# Patient Record
Sex: FEMALE | Race: WHITE | Employment: FULL TIME | URBAN - METROPOLITAN AREA
[De-identification: names, ages, dates, MRNs, and addresses within clinical notes are randomized per-mention and may not be internally consistent; named-entity substitution may affect disease eponyms.]

---

## 2018-11-26 ENCOUNTER — HOSPITAL ENCOUNTER (OUTPATIENT)
Dept: MAMMOGRAPHY | Age: 38
Discharge: HOME | End: 2018-11-26
Payer: COMMERCIAL

## 2018-11-26 DIAGNOSIS — Z12.31 SCREENING MAMMOGRAM, ENCOUNTER FOR: ICD-10-CM

## 2018-11-26 PROCEDURE — 77063 BREAST TOMOSYNTHESIS BI: CPT

## 2019-02-07 ENCOUNTER — HOSPITAL ENCOUNTER (OUTPATIENT)
Age: 39
Discharge: HOME | End: 2019-02-07
Payer: COMMERCIAL

## 2019-02-07 ENCOUNTER — HOSPITAL ENCOUNTER (OUTPATIENT)
Dept: GENERAL RADIOLOGY | Age: 39
Discharge: HOME | End: 2019-02-07
Payer: COMMERCIAL

## 2019-02-07 DIAGNOSIS — R05.9 COUGH: ICD-10-CM

## 2019-02-07 PROCEDURE — 71046 X-RAY EXAM CHEST 2 VIEWS: CPT

## 2019-02-13 ENCOUNTER — HOSPITAL ENCOUNTER (OUTPATIENT)
Age: 39
Discharge: HOME | End: 2019-02-13
Payer: COMMERCIAL

## 2019-02-13 ENCOUNTER — HOSPITAL ENCOUNTER (OUTPATIENT)
Dept: GENERAL RADIOLOGY | Age: 39
Discharge: HOME | End: 2019-02-13
Payer: COMMERCIAL

## 2019-02-13 DIAGNOSIS — J18.9 PNEUMONIA OF LEFT UPPER LOBE DUE TO INFECTIOUS ORGANISM: ICD-10-CM

## 2019-02-13 PROCEDURE — 71046 X-RAY EXAM CHEST 2 VIEWS: CPT

## 2019-02-15 ENCOUNTER — HOSPITAL ENCOUNTER (OUTPATIENT)
Dept: CT IMAGING | Age: 39
Discharge: HOME | End: 2019-02-15
Payer: COMMERCIAL

## 2019-02-15 DIAGNOSIS — R91.1 LUNG NODULE: ICD-10-CM

## 2019-02-15 PROCEDURE — 6360000004 HC RX CONTRAST MEDICATION: Performed by: FAMILY MEDICINE

## 2019-02-15 PROCEDURE — 71260 CT THORAX DX C+: CPT

## 2019-02-15 RX ADMIN — IOPAMIDOL 80 ML: 755 INJECTION, SOLUTION INTRAVENOUS at 16:04

## 2020-01-02 ENCOUNTER — HOSPITAL ENCOUNTER (OUTPATIENT)
Dept: MAMMOGRAPHY | Age: 40
Discharge: HOME | End: 2020-01-02
Payer: COMMERCIAL

## 2020-01-02 PROCEDURE — 77063 BREAST TOMOSYNTHESIS BI: CPT

## 2021-05-17 ENCOUNTER — HOSPITAL ENCOUNTER (OUTPATIENT)
Dept: WOMENS IMAGING | Age: 41
Discharge: HOME | End: 2021-05-17
Payer: COMMERCIAL

## 2021-05-17 DIAGNOSIS — Z12.31 SCREENING MAMMOGRAM, ENCOUNTER FOR: ICD-10-CM

## 2021-05-17 PROCEDURE — 77063 BREAST TOMOSYNTHESIS BI: CPT

## 2021-05-18 ENCOUNTER — TELEPHONE (OUTPATIENT)
Dept: WOMENS IMAGING | Age: 41
End: 2021-05-18

## 2022-05-12 ENCOUNTER — HOSPITAL ENCOUNTER (OUTPATIENT)
Age: 42
Discharge: HOME | End: 2022-05-12
Payer: COMMERCIAL

## 2022-05-12 LAB
FOLATE: 6.37 NG/ML (ref 4.78–24.2)
T4 FREE: 1.3 NG/DL (ref 0.9–1.8)
TSH SERPL DL<=0.05 MIU/L-ACNC: 12.74 UIU/ML (ref 0.27–4.2)
VITAMIN B-12: 449 PG/ML (ref 211–911)
VITAMIN D 25-HYDROXY: 55 NG/ML

## 2022-05-12 PROCEDURE — 84443 ASSAY THYROID STIM HORMONE: CPT

## 2022-05-12 PROCEDURE — 82746 ASSAY OF FOLIC ACID SERUM: CPT

## 2022-05-12 PROCEDURE — 82306 VITAMIN D 25 HYDROXY: CPT

## 2022-05-12 PROCEDURE — 82607 VITAMIN B-12: CPT

## 2022-05-12 PROCEDURE — 84439 ASSAY OF FREE THYROXINE: CPT

## 2022-06-15 ENCOUNTER — HOSPITAL ENCOUNTER (OUTPATIENT)
Age: 42
Discharge: HOME | End: 2022-06-15
Payer: COMMERCIAL

## 2022-06-15 LAB
T4 FREE: 1.8 NG/DL (ref 0.9–1.8)
TSH SERPL DL<=0.05 MIU/L-ACNC: 3.2 UIU/ML (ref 0.27–4.2)

## 2022-06-15 PROCEDURE — 84439 ASSAY OF FREE THYROXINE: CPT

## 2022-06-15 PROCEDURE — 84443 ASSAY THYROID STIM HORMONE: CPT

## 2022-09-12 ENCOUNTER — HOSPITAL ENCOUNTER (EMERGENCY)
Age: 42
Discharge: HOME | End: 2022-09-12
Attending: STUDENT IN AN ORGANIZED HEALTH CARE EDUCATION/TRAINING PROGRAM
Payer: COMMERCIAL

## 2022-09-12 VITALS
OXYGEN SATURATION: 99 % | BODY MASS INDEX: 44.64 KG/M2 | TEMPERATURE: 98 F | RESPIRATION RATE: 18 BRPM | HEART RATE: 65 BPM | WEIGHT: 285 LBS | DIASTOLIC BLOOD PRESSURE: 91 MMHG | SYSTOLIC BLOOD PRESSURE: 180 MMHG

## 2022-09-12 DIAGNOSIS — M71.21 SYNOVIAL CYST OF RIGHT POPLITEAL SPACE: ICD-10-CM

## 2022-09-12 DIAGNOSIS — M79.604 RIGHT LEG PAIN: Primary | ICD-10-CM

## 2022-09-12 PROCEDURE — 93971 EXTREMITY STUDY: CPT

## 2022-09-12 PROCEDURE — 99284 EMERGENCY DEPT VISIT MOD MDM: CPT

## 2022-09-12 ASSESSMENT — PAIN - FUNCTIONAL ASSESSMENT: PAIN_FUNCTIONAL_ASSESSMENT: 0-10

## 2022-09-12 ASSESSMENT — PAIN SCALES - GENERAL: PAINLEVEL_OUTOF10: 10

## 2022-09-12 ASSESSMENT — LIFESTYLE VARIABLES: HOW OFTEN DO YOU HAVE A DRINK CONTAINING ALCOHOL: NEVER

## 2022-09-12 NOTE — Clinical Note
Sarika Gonzalez was seen and treated in our emergency department on 9/12/2022. She may return to work on 09/13/2022. If you have any questions or concerns, please don't hesitate to call.       Katya Us MD

## 2022-09-12 NOTE — DISCHARGE INSTRUCTIONS
You were seen in the emergency department for right leg pain. Fortunately your ultrasound did not show any blood clot. Your pain is likely due to a Baker's cyst.  Please follow-up with your primary care doctor in the next 2 days regarding her symptoms and recovery. He can return to the emergency department at anytime with any new or ongoing health concerns. We hope you feel better soon.

## 2022-09-12 NOTE — ED PROVIDER NOTES
ATTENDING PHYSICIAN NOTE       Date of evaluation: 9/12/2022    Chief Complaint     Leg Pain (Right leg pain for a few weeks, behind right knee and calf, denies recent travel, no history of blood clots, not on blood thinners)      History of Present Illness     Cass Lopez is a 38 y.o. female who presents right leg pain for the past few weeks. States that pain is behind right knee and calf. States that pain comes and goes, when present 10/10, worsens with ambulation. Reports some swelling. She denies any hormone use, recent surgery, recent travel. She denies any history of DVT or PE in the past.  Denies any family history. Denies any injury or trauma to her leg. Denies any fever, vomiting, chest pain, shortness of breath or abdominal pain. Denies any change in bowel or bladder. Denies any history of heart failure or significant cardiac disease. Review of Systems     Review of Systems   All other systems reviewed and are negative. Past Medical, Surgical, Family, and Social History     She has a past medical history of Bell's palsy, Goiter, simple, and Morbid obesity ([address] She has a past surgical history that includes knee surgery and Tonsillectomy. Her family history includes Breast Cancer in her maternal aunt; Cancer in her mother; Heart Disease in her maternal aunt, maternal grandfather, maternal grandmother, and mother; Stroke in her mother. She reports that she is a non-smoker but has been exposed to tobacco smoke. She has never used smokeless tobacco. She reports that she does not drink alcohol and does not use drugs. Medications     Previous Medications    DEXAMETHASONE (DECADRON) 2 MG TABLET    Take all five tablets at one time on 7/24/2016       Allergies     She is allergic to codeine. Physical Exam     INITIAL VITALS: BP: (!) 180/91, Temp: 98 °F (36.7 °C), Heart Rate: 65, Resp: 18, SpO2: 99 %   Physical Exam  Vitals and nursing note reviewed.    Constitutional:       General: She is not in acute distress. Appearance: She is normal weight. She is not toxic-appearing. HENT:      Head: Normocephalic and atraumatic. Right Ear: External ear normal.      Left Ear: External ear normal.      Nose: Nose normal.      Mouth/Throat:      Pharynx: Oropharynx is clear. No oropharyngeal exudate or posterior oropharyngeal erythema. Eyes:      Extraocular Movements: Extraocular movements intact. Conjunctiva/sclera: Conjunctivae normal.   Cardiovascular:      Rate and Rhythm: Normal rate and regular rhythm. Pulses: Normal pulses. Heart sounds: Normal heart sounds. Pulmonary:      Effort: Pulmonary effort is normal. No respiratory distress. Breath sounds: Normal breath sounds. Abdominal:      General: There is no distension. Palpations: Abdomen is soft. Tenderness: There is no abdominal tenderness. There is no right CVA tenderness, left CVA tenderness, guarding or rebound. Musculoskeletal:      Cervical back: Normal range of motion and neck supple. Comments: Mild asymmetric swelling with right leg greater than left. She has tenderness posteriorly in the long calf, no overlying erythema or warmth, neurovascularly intact, range of motion limited by pain. No focal bony tenderness   Skin:     General: Skin is warm. Capillary Refill: Capillary refill takes less than 2 seconds. Findings: No bruising or erythema. Neurological:      General: No focal deficit present. Cranial Nerves: No cranial nerve deficit. Sensory: No sensory deficit. Motor: No weakness. Coordination: Coordination normal.   Psychiatric:         Mood and Affect: Mood normal.         Behavior: Behavior normal.       Diagnostic Results       RADIOLOGY:  VL Extremity Venous Right             LABS:   No results found for this visit on 09/12/22. ED BEDSIDE ULTRASOUND:  No results found.     RECENT VITALS:  BP: (!) 180/91,Temp: 98 °F (36.7 °C), Heart Rate: 65, Resp: 18, SpO2: 99 %     Procedures         ED Course     Nursing Notes, Past Medical Hx, Past Surgical Hx, Social Hx,Allergies, and Family Hx were reviewed. patient was given the following medications:  No orders of the defined types were placed in this encounter. CONSULTS:  None    MEDICAL DECISIONMAKING / ASSESSMENT / PLAN     Yancy Cummings is a 38 y.o. female who presents with right leg pain. She was hypertensive, afebrile, heart rate of 65, normal respiratory rate of 18 and satting at 99% on room air. On exam she had right calf tenderness and swelling. Given history and exam I am concerned for underlying DVT. No signs or symptoms concerning for cellulitis. She has no traumatic injury to suggest focal bony abnormality. She is neurovascularly intact with normal pulses therefore doubt underlying emboli. I obtained vascular Doppler ultrasound as noted below    I interpreted the imaging and note  Venous Doppler right lower extremity negative for acute DVT. Notable for Baker's cyst    Given work-up above presentation likely secondary to Espino's cyst.  Again no evidence of DVT. She has no hemodynamic instability to suggest PE. Advised patient to follow-up with her primary care doctor. She can take ibuprofen and Tylenol as needed for pain. All questions and concerns were addressed. Strict return precautions reviewed. Clinical Impression     1. Right leg pain    2.  Synovial cyst of right popliteal space        Disposition     PATIENT REFERRED TO:  SANDIE Hernandez - CNP  [facility] [address]  [address] [facility]  [phone]    In 2 days      DISCHARGE MEDICATIONS:  New Prescriptions    No medications on file       DISPOSITION Decision To Discharge 09/12/2022 12:35:49 PM          Peter De León MD  09/12/22 3133

## 2022-09-13 ENCOUNTER — TELEPHONE (OUTPATIENT)
Dept: ORTHOPEDIC SURGERY | Age: 42
End: 2022-09-13

## 2022-09-13 ENCOUNTER — OFFICE VISIT (OUTPATIENT)
Dept: ORTHOPEDIC SURGERY | Age: 42
End: 2022-09-13
Payer: COMMERCIAL

## 2022-09-13 VITALS — BODY MASS INDEX: 45.99 KG/M2 | WEIGHT: 293 LBS | HEIGHT: 67 IN

## 2022-09-13 DIAGNOSIS — S83.231A COMPLEX TEAR OF MEDIAL MENISCUS OF RIGHT KNEE AS CURRENT INJURY, INITIAL ENCOUNTER: Primary | ICD-10-CM

## 2022-09-13 DIAGNOSIS — M71.21 SYNOVIAL CYST OF RIGHT KNEE: ICD-10-CM

## 2022-09-13 DIAGNOSIS — M94.261 CHONDROMALACIA OF KNEE, RIGHT: ICD-10-CM

## 2022-09-13 DIAGNOSIS — M25.561 ACUTE PAIN OF RIGHT KNEE: ICD-10-CM

## 2022-09-13 PROCEDURE — 99204 OFFICE O/P NEW MOD 45 MIN: CPT | Performed by: ORTHOPAEDIC SURGERY

## 2022-09-13 RX ORDER — LEVOTHYROXINE SODIUM 175 UG/1
175 TABLET ORAL DAILY
COMMUNITY

## 2022-09-13 RX ORDER — LISINOPRIL 10 MG/1
10 TABLET ORAL DAILY
COMMUNITY

## 2022-09-13 RX ORDER — MELOXICAM 15 MG/1
15 TABLET ORAL DAILY PRN
Qty: 30 TABLET | Refills: 0 | Status: SHIPPED | OUTPATIENT
Start: 2022-09-13

## 2022-09-13 NOTE — PROGRESS NOTES
ORTHOPAEDIC SURGERY H&P / CONSULTATION NOTE    Chief complaint:   Chief Complaint   Patient presents with    Knee Pain     Espino cyst      History of present illness: The patient is a 38 y.o. female with subjective symptoms of right knee pain. The chief complaint is located at posterior aspect right knee, occasional medial/lateral. Duration of symptoms has been for 2 weeks. The severity of symptoms is rated at 0/10 pain while sitting however 7-8/10 pain by walking on intake form. Patient denies trauma but states that sharp pains with walking and specifically with mechanical twisting knee pain. She had so much pain that she went to the emergency room on 9/12/2022. Ultrasound confirmed no DVT however she did have a Baker's cyst per his report. She states dull throbbing aching pain. Occasional swelling. She states sharp pain with twisting and knee motion. Denies gross instability. The patient has tried the below listed items prior to today's consultation for above listed chief complaint.     -   Over-the-counter anti-inflammatories/prescription medication anti-inflammatory. -   Physical therapy / guided home exercise program -     -   Previous corticosteroid injections    Past medical history:    Past Medical History:   Diagnosis Date    Bell's palsy     Goiter, simple     Morbid obesity (HCC)         Past surgical history:    Past Surgical History:   Procedure Laterality Date    KNEE SURGERY      in 6th grade left knee    TONSILLECTOMY      when 6 yrs old        Allergies:     Allergies   Allergen Reactions    Codeine Nausea And Vomiting         Medications:   Current Outpatient Medications:     levothyroxine (SYNTHROID) 175 MCG tablet, Take 175 mcg by mouth Daily, Disp: , Rfl:     lisinopril (PRINIVIL;ZESTRIL) 10 MG tablet, Take 10 mg by mouth daily, Disp: , Rfl:     meloxicam (MOBIC) 15 MG tablet, Take 1 tablet by mouth daily as needed for Pain, Disp: 30 tablet, Rfl: 0    dexamethasone (DECADRON) 2 MG tablet, Take all five tablets at one time on 7/24/2016 (Patient not taking: Reported on 9/13/2022), Disp: 5 tablet, Rfl: 0     Social history: Denies IV drug use. Social History     Socioeconomic History    Marital status:      Spouse name: Not on file    Number of children: Not on file    Years of education: Not on file    Highest education level: Not on file   Occupational History    Not on file   Tobacco Use    Smoking status: Never     Passive exposure: Yes    Smokeless tobacco: Never   Substance and Sexual Activity    Alcohol use: No    Drug use: No    Sexual activity: Yes     Partners: Male   Other Topics Concern    Not on file   Social History Narrative    Not on file     Social Determinants of Health     Financial Resource Strain: Not on file   Food Insecurity: Not on file   Transportation Needs: Not on file   Physical Activity: Not on file   Stress: Not on file   Social Connections: Not on file   Intimate Partner Violence: Not on file   Housing Stability: Not on file     Tobacco use. Social History     Tobacco Use   Smoking Status Never    Passive exposure: Yes   Smokeless Tobacco Never     Employment: Noncontributory    Workers compensation claim: Noncontributory    Review of systems: Patient denies any fevers chills chest pain shortness of breath nausea vomiting significant weight loss any change in voiding or bowel movements. Patient denies any significant numbness or tingling at baseline as it relates to this presenting symptom/chief complaint. The patient denies any significant problems with skin or any significant allergies. Physical examination:  Body mass index is 46.36 kg/m².           Positive antalgic gait to the right  AAOx3, NCAT  EOMI  MMM  RR  Unlabored breathing, no wheezing  Skin intact BUE and BLE, warm and moist  Bilateral lower extremity examination specific to subjective symptoms  Exam Right Lower Extremity  Negative effusion, 0/110/0 limited secondary to pain active ROM (E/F/Lag), same P assive ROM (E/F/Lag), negative anterior Drawer, 1A Lachman,   negative posterior Drawer,   Stable varus/valgus at 0 and 30?,    positive medial/lateral TTP Joint Line, positive medial/lateral Edilberto,   trace Abhijit's  Skin intact throughout  5/5 IP Q H TA G EHL  SILT DP SP LP MP S S  +2 DP pulse      Diagnostic imaging:  MY READ:  4 view right knee 9/13/2022: Negative fracture. Positive medial joint space narrowing and patellofemoral joint space narrowing mild. Ultrasound right lower extremity venous duplex: 9/12/2022:    Impressions   Right Impression   Technically difficult and limited study due to body habitus and depth of   vessels. Within the limits of this exam, there is no evidence of deep or superficial   venous thrombosis involving the right lower extremity. Incidental finding of a cystic structure at the lateral joint space measuring   5.30 cm consistent with a Baker's cyst.   Left Impression   There is no evidence of deep venous thrombosis involving the left common   femoral vein. There is no previous exam for comparison. Conclusions        Summary        No evidence of deep vein or superficial thrombosis involving the right lower    extremity and the contralateral proximal common femoral vein. Pertinent lab work: None     Diagnosis Orders   1. Complex tear of medial meniscus of right knee as current injury, initial encounter  MRI KNEE RIGHT WO CONTRAST    meloxicam (MOBIC) 15 MG tablet      2. Chondromalacia of knee, right  MRI KNEE RIGHT WO CONTRAST      3. Synovial cyst of right knee        4.  Acute pain of right knee  XR KNEE RIGHT (MIN 4 VIEWS)          Assessment and plan: 38 y.o. female with current subjective symptoms and physical exam findings with diagnostic imaging correlating to right knee medial meniscus tear in the setting of chondromalacia and Baker's cyst.  -Time of 23 minutes was spent coordinating and discussing the clinical findings, reviewing diagnostic imaging as indicated, coordinating care with prior notes review and current clinical encounter documentation as it pertains to the patient's presenting subjective symptoms and diagnoses. -I reviewed with the patient the imaging findings as well as clinical exam and  how it correlates to subjective symptoms.  -Additional time was taken today to review previous notes in the ER as well as review with the patient findings of ultrasound from yesterday. I also took time to review with her her radiographs today.  -She has pretty significant pain with ambulation. I did offer crutches however she does not wish for any. Her knee is stable ligamentously. I would like to obtain a MRI to look at the medial meniscus as well as lateral meniscus given clinical examination findings and also ultrasound showing a Baker's cyst.  I did review with her the natural history evolution with regard to Baker's cyst symptoms waxing and waning and did not necessarily advocate for aspiration in the office.  -Continue activity modification to include low impact elliptical stationary bike swimming and walking  -Physician directed physical therapy program was printed out and given to the patient today  -Mobic 15 mg p.o. daily as needed pain and OTC Tylenol per bottle as needed discomfort  -All questions answered to the patient's satisfaction and the patient expressed understanding and agreement with the above listed treatment plan  -Follow up in after MRI completed to review additional treatment options to include nonoperative versus surgical treatment  -Thank you for the clinical consultation and allowing me to participate in the patient's care. Electronically signed by Silvino Brownlee MD on 9/13/22 at 2:03 PM XIMENA Brownlee MD       Orthopaedic Surgery-Sports Medicine        Disclaimer: This note was dictated with voice recognition software.   Though review and correction are routinely performed, please contact the office/medical records for any errors requiring correction.

## 2022-09-13 NOTE — TELEPHONE ENCOUNTER
If cyst is drained today, yes patient would be able to drive afterwards. Dr. Apurva Avila will go over treatment plan during office visit today.  KB

## 2022-09-13 NOTE — TELEPHONE ENCOUNTER
General Question     Subject: RT KNEE/NP APPT TODAY  Patient Request: Gabriellewally Arielle  Contact Number: [phone]    PATIENT HAS A QUESTION REGARDING APPT FOR TODAY. WAS TOLD SHE DOES HAVE A BAKERS CYST, AND IS ASKING IF THIS IS DRAINED TODAY, WILL SHE BE ABLE TO DRIVE AFTERWARD.     PLEASE ADVISE

## 2022-09-14 ENCOUNTER — TELEPHONE (OUTPATIENT)
Dept: ORTHOPEDIC SURGERY | Age: 42
End: 2022-09-14

## 2022-09-14 NOTE — TELEPHONE ENCOUNTER
MRI RT KNEE APPROVED. Celso Sheldon # [id number]  DATE RANGE 09/13/22-10/22/22. Called patient to let them know that their MRI has been authorized and that they can call and schedule scan at their convenience. Also told them that they can call and schedule a f/u with Dr. Sirena Mann once they have MRI scheduled, leaving at least 2-3 days for our office to receive their results.   Mitzy Payne

## 2024-08-15 ENCOUNTER — HOSPITAL ENCOUNTER (EMERGENCY)
Age: 44
Discharge: HOME | End: 2024-08-15
Attending: EMERGENCY MEDICINE
Payer: COMMERCIAL

## 2024-08-15 ENCOUNTER — APPOINTMENT (OUTPATIENT)
Dept: GENERAL RADIOLOGY | Age: 44
End: 2024-08-15
Payer: COMMERCIAL

## 2024-08-15 VITALS
HEART RATE: 84 BPM | BODY MASS INDEX: 45.99 KG/M2 | RESPIRATION RATE: 16 BRPM | TEMPERATURE: 98.2 F | WEIGHT: 293 LBS | DIASTOLIC BLOOD PRESSURE: 75 MMHG | OXYGEN SATURATION: 100 % | HEIGHT: 67 IN | SYSTOLIC BLOOD PRESSURE: 166 MMHG

## 2024-08-15 DIAGNOSIS — R31.9 HEMATURIA, UNSPECIFIED TYPE: ICD-10-CM

## 2024-08-15 DIAGNOSIS — R60.0 LOWER EXTREMITY EDEMA: Primary | ICD-10-CM

## 2024-08-15 DIAGNOSIS — D64.9 ANEMIA, UNSPECIFIED TYPE: ICD-10-CM

## 2024-08-15 DIAGNOSIS — R05.1 ACUTE COUGH: ICD-10-CM

## 2024-08-15 DIAGNOSIS — I51.7 CARDIOMEGALY: ICD-10-CM

## 2024-08-15 LAB
ALBUMIN SERPL-MCNC: 3.4 G/DL (ref 3.4–5)
ALBUMIN/GLOB SERPL: 1.2 {RATIO} (ref 1.1–2.2)
ALP SERPL-CCNC: 104 U/L (ref 40–129)
ALT SERPL-CCNC: 25 U/L (ref 10–40)
ANION GAP SERPL CALCULATED.3IONS-SCNC: 9 MMOL/L (ref 3–16)
AST SERPL-CCNC: 28 U/L (ref 15–37)
BACTERIA URNS QL MICRO: ABNORMAL /HPF
BASOPHILS # BLD: 0 K/UL (ref 0–0.2)
BASOPHILS NFR BLD: 0.2 %
BILIRUB SERPL-MCNC: 0.6 MG/DL (ref 0–1)
BILIRUB UR QL STRIP.AUTO: NEGATIVE
BUN SERPL-MCNC: 33 MG/DL (ref 7–20)
CALCIUM SERPL-MCNC: 8.2 MG/DL (ref 8.3–10.6)
CHLORIDE SERPL-SCNC: 105 MMOL/L (ref 99–110)
CLARITY UR: CLEAR
CO2 SERPL-SCNC: 22 MMOL/L (ref 21–32)
COLOR UR: YELLOW
CREAT SERPL-MCNC: 1 MG/DL (ref 0.6–1.1)
DEPRECATED RDW RBC AUTO: 15.5 % (ref 12.4–15.4)
EKG ATRIAL RATE: 62 BPM
EKG DIAGNOSIS: NORMAL
EKG P AXIS: 33 DEGREES
EKG P-R INTERVAL: 158 MS
EKG Q-T INTERVAL: 410 MS
EKG QRS DURATION: 92 MS
EKG QTC CALCULATION (BAZETT): 416 MS
EKG R AXIS: 40 DEGREES
EKG T AXIS: 26 DEGREES
EKG VENTRICULAR RATE: 62 BPM
EOSINOPHIL # BLD: 0.1 K/UL (ref 0–0.6)
EOSINOPHIL NFR BLD: 1.8 %
EPI CELLS #/AREA URNS HPF: ABNORMAL /HPF (ref 0–5)
FLUAV RNA RESP QL NAA+PROBE: NOT DETECTED
FLUBV RNA RESP QL NAA+PROBE: NOT DETECTED
GFR SERPLBLD CREATININE-BSD FMLA CKD-EPI: 70 ML/MIN/{1.73_M2}
GLUCOSE SERPL-MCNC: 111 MG/DL (ref 70–99)
GLUCOSE UR STRIP.AUTO-MCNC: NEGATIVE MG/DL
HCG SERPL QL: NEGATIVE
HCT VFR BLD AUTO: 30.7 % (ref 36–48)
HGB BLD-MCNC: 9.5 G/DL (ref 12–16)
HGB UR QL STRIP.AUTO: ABNORMAL
KETONES UR STRIP.AUTO-MCNC: NEGATIVE MG/DL
LEUKOCYTE ESTERASE UR QL STRIP.AUTO: NEGATIVE
LYMPHOCYTES # BLD: 0.9 K/UL (ref 1–5.1)
LYMPHOCYTES NFR BLD: 12.5 %
MCH RBC QN AUTO: 22.2 PG (ref 26–34)
MCHC RBC AUTO-ENTMCNC: 31 G/DL (ref 31–36)
MCV RBC AUTO: 71.8 FL (ref 80–100)
MONOCYTES # BLD: 0.8 K/UL (ref 0–1.3)
MONOCYTES NFR BLD: 10.2 %
NEUTROPHILS # BLD: 5.7 K/UL (ref 1.7–7.7)
NEUTROPHILS NFR BLD: 75.3 %
NITRITE UR QL STRIP.AUTO: NEGATIVE
NT-PROBNP SERPL-MCNC: 731 PG/ML (ref 0–124)
PH UR STRIP.AUTO: 6 [PH] (ref 5–8)
PLATELET # BLD AUTO: 231 K/UL (ref 135–450)
PMV BLD AUTO: 8 FL (ref 5–10.5)
POTASSIUM SERPL-SCNC: 4.7 MMOL/L (ref 3.5–5.1)
PROT SERPL-MCNC: 6.3 G/DL (ref 6.4–8.2)
PROT UR STRIP.AUTO-MCNC: 100 MG/DL
RBC # BLD AUTO: 4.28 M/UL (ref 4–5.2)
RBC #/AREA URNS HPF: ABNORMAL /HPF (ref 0–4)
S PYO AG THROAT QL: NEGATIVE
SARS-COV-2 RNA RESP QL NAA+PROBE: NOT DETECTED
SODIUM SERPL-SCNC: 136 MMOL/L (ref 136–145)
SP GR UR STRIP.AUTO: 1.01 (ref 1–1.03)
UA COMPLETE W REFLEX CULTURE PNL UR: ABNORMAL
UA DIPSTICK W REFLEX MICRO PNL UR: YES
URN SPEC COLLECT METH UR: ABNORMAL
UROBILINOGEN UR STRIP-ACNC: 0.2 E.U./DL
WBC # BLD AUTO: 7.6 K/UL (ref 4–11)
WBC #/AREA URNS HPF: ABNORMAL /HPF (ref 0–5)

## 2024-08-15 PROCEDURE — 84703 CHORIONIC GONADOTROPIN ASSAY: CPT

## 2024-08-15 PROCEDURE — 85025 COMPLETE CBC W/AUTO DIFF WBC: CPT

## 2024-08-15 PROCEDURE — 81001 URINALYSIS AUTO W/SCOPE: CPT

## 2024-08-15 PROCEDURE — 80053 COMPREHEN METABOLIC PANEL: CPT

## 2024-08-15 PROCEDURE — 87081 CULTURE SCREEN ONLY: CPT

## 2024-08-15 PROCEDURE — 99285 EMERGENCY DEPT VISIT HI MDM: CPT

## 2024-08-15 PROCEDURE — 93005 ELECTROCARDIOGRAM TRACING: CPT | Performed by: EMERGENCY MEDICINE

## 2024-08-15 PROCEDURE — 2580000003 HC RX 258

## 2024-08-15 PROCEDURE — 93010 ELECTROCARDIOGRAM REPORT: CPT | Performed by: INTERNAL MEDICINE

## 2024-08-15 PROCEDURE — 6360000002 HC RX W HCPCS: Performed by: EMERGENCY MEDICINE

## 2024-08-15 PROCEDURE — 83880 ASSAY OF NATRIURETIC PEPTIDE: CPT

## 2024-08-15 PROCEDURE — 71045 X-RAY EXAM CHEST 1 VIEW: CPT

## 2024-08-15 PROCEDURE — 6360000002 HC RX W HCPCS

## 2024-08-15 PROCEDURE — 96374 THER/PROPH/DIAG INJ IV PUSH: CPT

## 2024-08-15 PROCEDURE — 87636 SARSCOV2 & INF A&B AMP PRB: CPT

## 2024-08-15 PROCEDURE — 96375 TX/PRO/DX INJ NEW DRUG ADDON: CPT

## 2024-08-15 PROCEDURE — 87880 STREP A ASSAY W/OPTIC: CPT

## 2024-08-15 RX ORDER — FUROSEMIDE 10 MG/ML
20 INJECTION INTRAMUSCULAR; INTRAVENOUS ONCE
Status: COMPLETED | OUTPATIENT
Start: 2024-08-15 | End: 2024-08-15

## 2024-08-15 RX ORDER — TOPIRAMATE 25 MG/1
50 TABLET ORAL 2 TIMES DAILY
COMMUNITY

## 2024-08-15 RX ORDER — FUROSEMIDE 20 MG/1
20 TABLET ORAL DAILY
Qty: 30 TABLET | Refills: 3 | Status: SHIPPED | OUTPATIENT
Start: 2024-08-15

## 2024-08-15 RX ORDER — MULTIVIT-MIN/IRON/FOLIC ACID/K 18-600-40
CAPSULE ORAL
COMMUNITY

## 2024-08-15 RX ADMIN — FUROSEMIDE 20 MG: 10 INJECTION, SOLUTION INTRAMUSCULAR; INTRAVENOUS at 11:15

## 2024-08-15 RX ADMIN — METHYLPREDNISOLONE SODIUM SUCCINATE 125 MG: 125 INJECTION INTRAMUSCULAR; INTRAVENOUS at 08:55

## 2024-08-15 ASSESSMENT — PAIN - FUNCTIONAL ASSESSMENT
PAIN_FUNCTIONAL_ASSESSMENT: NONE - DENIES PAIN

## 2024-08-15 ASSESSMENT — LIFESTYLE VARIABLES
HOW MANY STANDARD DRINKS CONTAINING ALCOHOL DO YOU HAVE ON A TYPICAL DAY: 1 OR 2
HOW OFTEN DO YOU HAVE A DRINK CONTAINING ALCOHOL: MONTHLY OR LESS

## 2024-08-15 NOTE — ED PROVIDER NOTES
Emergency Department Attending Provider Note  Location: [facility]  ED  8/15/2024     Patient Identification  Court Rascon is a 44 y.o. female      Court Rascon was evaluated in the Emergency Department for bilateral pedal edema, ear pain, possible tongue swelling.     HPI: as noted above    Physical Exam: Mildly elevated blood pressure, old left Bell's palsy, clear TMs, normal tongue, clear breath sounds, trace pedal edema bilaterally      EKG Interpretation  Rhythm is sinus  Rate is 62  Axis is NL  No STEMI criteria  Qtc is 416      Patient seen and evaluated.  Relevant records reviewed.  Patient presents with symptoms noted above.   Patient was found to have an elevated BNP and signs of mild pulmonary vascular congestion on chest x-ray.  Most consistent with new heart failure.  Given Lasix in the ER.  She continues to breathe comfortably-no dyspnea, no hypoxia.  She is familiar with heart failure as her  has this problem.  We spoke about inpatient observation/management.  Prefers to go home.  Will discharge with Lasix, encouraged to follow-up with cardiology as soon as possible, will discharge with return precautions    Although initial history and physical exam information was obtained by HECTOR/NPP/MD/DO (who also dictated a record of this visit), I personally saw the patient and made/approved the management plan and take responsibility for patient management. I, Dr. Quijano, am the primary clinician of record.   This chart was generated in part by using Dragon Dictation system and may contain errors related to that system including errors in grammar, punctuation, and spelling, as well as words and phrases that may be inappropriate. If there are any questions or concerns please feel free to contact the dictating provider for clarification.     Kenney Quijano MD   [facility] [facility] [employer]       Kenney Quijano MD  08/15/24 5233

## 2024-08-15 NOTE — ED PROVIDER NOTES
facility-administered medications for this encounter.     Current Outpatient Medications   Medication Sig Dispense Refill    topiramate (TOPAMAX) 25 MG tablet Take 2 tablets by mouth 2 times daily      Cholecalciferol (VITAMIN D) 50 MCG (2000 UT) CAPS capsule Take by mouth      furosemide (LASIX) 20 MG tablet Take 1 tablet by mouth daily 30 tablet 3    levothyroxine (SYNTHROID) 175 MCG tablet Take 1 tablet by mouth Daily      lisinopril (PRINIVIL;ZESTRIL) 10 MG tablet Take 1 tablet by mouth daily      meloxicam (MOBIC) 15 MG tablet Take 1 tablet by mouth daily as needed for Pain 30 tablet 0    dexamethasone (DECADRON) 2 MG tablet Take all five tablets at one time on 7/24/2016 5 tablet 0     Allergies:  Allergies   Allergen Reactions    Codeine Nausea And Vomiting     Screenings:     Rodman Coma Scale  Eye Opening: Spontaneous  Best Verbal Response: Oriented  Best Motor Response: Obeys commands  Rodman Coma Scale Score: 15           CIWA Assessment  BP: (!) 166/75  Pulse: 84          REVIEW OF SYSTEMS  Positives and Pertinent Negatives as per HPI.    PHYSICAL EXAM  ED Triage Vitals [08/15/24 0821]   BP Systolic BP Percentile Diastolic BP Percentile Temp Temp Source Pulse Respirations SpO2   (!) 189/104 -- -- 98.2 °F (36.8 °C) Oral 83 18 100 %      Height Weight - Scale         1.702 m (5' 7\") (!) 139.7 kg (308 lb)             Physical Exam  Vitals and nursing note reviewed.   Constitutional:       General: She is not in acute distress.     Appearance: Normal appearance. She is not ill-appearing, toxic-appearing or diaphoretic.   HENT:      Head: Normocephalic and atraumatic.      Nose: Nose normal.      Mouth/Throat:      Mouth: Mucous membranes are moist.   Eyes:      Extraocular Movements: Extraocular movements intact.      Pupils: Pupils are equal, round, and reactive to light.   Cardiovascular:      Rate and Rhythm: Normal rate and regular rhythm.   Pulmonary:      Effort: Pulmonary effort is normal. No  [facility] [place], [facility]  [address]  [facility] [address]  [phone]          Discharge Medications:   Discharge Medication List as of 8/15/2024 11:45 AM        START taking these medications    Details   furosemide (LASIX) 20 MG tablet Take 1 tablet by mouth daily, Disp-30 tablet, R-3Normal           Discontinued Medications:  Discharge Medication List as of 8/15/2024 11:45 AM        Risk management discussed and shared decision making had with patient and/or surrogate. All questions were answered. Patient will follow up with PCP and cardiology for further evaluation/treatment.  All questions answered.  Patient will return to ED for new/worsening symptoms.    DISPOSITION:  Patient was discharged.    (Please note that portions of this note were completed with a voice recognition program.  Efforts were made to edit the dictations but occasionally words are mis-transcribed).         Gonzalez Clark, MIRYAM  08/15/24 9600

## 2024-08-17 LAB — S PYO THROAT QL CULT: NORMAL

## 2024-09-03 ENCOUNTER — HOSPITAL ENCOUNTER (INPATIENT)
Age: 44
LOS: 3 days | Discharge: HOME | DRG: 812 | End: 2024-09-06
Attending: STUDENT IN AN ORGANIZED HEALTH CARE EDUCATION/TRAINING PROGRAM | Admitting: INTERNAL MEDICINE
Payer: COMMERCIAL

## 2024-09-03 ENCOUNTER — APPOINTMENT (OUTPATIENT)
Dept: GENERAL RADIOLOGY | Age: 44
DRG: 812 | End: 2024-09-03
Payer: COMMERCIAL

## 2024-09-03 DIAGNOSIS — R60.0 EDEMA, LOWER EXTREMITY: ICD-10-CM

## 2024-09-03 DIAGNOSIS — N17.9 AKI (ACUTE KIDNEY INJURY) (HCC): Primary | ICD-10-CM

## 2024-09-03 DIAGNOSIS — I50.9 CONGESTIVE HEART FAILURE, UNSPECIFIED HF CHRONICITY, UNSPECIFIED HEART FAILURE TYPE (HCC): ICD-10-CM

## 2024-09-03 LAB
ALBUMIN SERPL-MCNC: 3.1 G/DL (ref 3.4–5)
ALBUMIN/GLOB SERPL: 0.8 {RATIO} (ref 1.1–2.2)
ALP SERPL-CCNC: 100 U/L (ref 40–129)
ALT SERPL-CCNC: 17 U/L (ref 10–40)
ANION GAP SERPL CALCULATED.3IONS-SCNC: 9 MMOL/L (ref 3–16)
AST SERPL-CCNC: 17 U/L (ref 15–37)
BASOPHILS # BLD: 0 K/UL (ref 0–0.2)
BASOPHILS NFR BLD: 0.3 %
BILIRUB SERPL-MCNC: 0.3 MG/DL (ref 0–1)
BUN SERPL-MCNC: 56 MG/DL (ref 7–20)
CALCIUM SERPL-MCNC: 7.6 MG/DL (ref 8.3–10.6)
CHLORIDE SERPL-SCNC: 99 MMOL/L (ref 99–110)
CO2 SERPL-SCNC: 23 MMOL/L (ref 21–32)
CREAT SERPL-MCNC: 1.3 MG/DL (ref 0.6–1.1)
DEPRECATED RDW RBC AUTO: 16.9 % (ref 12.4–15.4)
EOSINOPHIL # BLD: 0.3 K/UL (ref 0–0.6)
EOSINOPHIL NFR BLD: 3.4 %
GFR SERPLBLD CREATININE-BSD FMLA CKD-EPI: 51 ML/MIN/{1.73_M2}
GLUCOSE SERPL-MCNC: 131 MG/DL (ref 70–99)
HCT VFR BLD AUTO: 29 % (ref 36–48)
HGB BLD-MCNC: 9.3 G/DL (ref 12–16)
LYMPHOCYTES # BLD: 0.9 K/UL (ref 1–5.1)
LYMPHOCYTES NFR BLD: 8.7 %
MCH RBC QN AUTO: 22.7 PG (ref 26–34)
MCHC RBC AUTO-ENTMCNC: 32 G/DL (ref 31–36)
MCV RBC AUTO: 70.7 FL (ref 80–100)
MONOCYTES # BLD: 1 K/UL (ref 0–1.3)
MONOCYTES NFR BLD: 9.6 %
NEUTROPHILS # BLD: 7.7 K/UL (ref 1.7–7.7)
NEUTROPHILS NFR BLD: 78 %
NT-PROBNP SERPL-MCNC: 742 PG/ML (ref 0–124)
PLATELET # BLD AUTO: 390 K/UL (ref 135–450)
PMV BLD AUTO: 7.3 FL (ref 5–10.5)
POTASSIUM SERPL-SCNC: 4.2 MMOL/L (ref 3.5–5.1)
PROT SERPL-MCNC: 6.8 G/DL (ref 6.4–8.2)
RBC # BLD AUTO: 4.1 M/UL (ref 4–5.2)
SODIUM SERPL-SCNC: 131 MMOL/L (ref 136–145)
TROPONIN, HIGH SENSITIVITY: 7 NG/L (ref 0–14)
WBC # BLD AUTO: 9.9 K/UL (ref 4–11)

## 2024-09-03 PROCEDURE — 71046 X-RAY EXAM CHEST 2 VIEWS: CPT

## 2024-09-03 PROCEDURE — 80053 COMPREHEN METABOLIC PANEL: CPT

## 2024-09-03 PROCEDURE — 2580000003 HC RX 258: Performed by: PHYSICIAN ASSISTANT

## 2024-09-03 PROCEDURE — 93005 ELECTROCARDIOGRAM TRACING: CPT | Performed by: STUDENT IN AN ORGANIZED HEALTH CARE EDUCATION/TRAINING PROGRAM

## 2024-09-03 PROCEDURE — 96374 THER/PROPH/DIAG INJ IV PUSH: CPT

## 2024-09-03 PROCEDURE — 96375 TX/PRO/DX INJ NEW DRUG ADDON: CPT

## 2024-09-03 PROCEDURE — 84484 ASSAY OF TROPONIN QUANT: CPT

## 2024-09-03 PROCEDURE — 6360000002 HC RX W HCPCS: Performed by: PHYSICIAN ASSISTANT

## 2024-09-03 PROCEDURE — 85025 COMPLETE CBC W/AUTO DIFF WBC: CPT

## 2024-09-03 PROCEDURE — 1200000000 HC SEMI PRIVATE

## 2024-09-03 PROCEDURE — 83880 ASSAY OF NATRIURETIC PEPTIDE: CPT

## 2024-09-03 PROCEDURE — 99285 EMERGENCY DEPT VISIT HI MDM: CPT

## 2024-09-03 PROCEDURE — 96361 HYDRATE IV INFUSION ADD-ON: CPT

## 2024-09-03 RX ORDER — ONDANSETRON 2 MG/ML
4 INJECTION INTRAMUSCULAR; INTRAVENOUS EVERY 6 HOURS PRN
Status: DISCONTINUED | OUTPATIENT
Start: 2024-09-03 | End: 2024-09-06 | Stop reason: HOSPADM

## 2024-09-03 RX ORDER — MAGNESIUM SULFATE IN WATER 40 MG/ML
2000 INJECTION, SOLUTION INTRAVENOUS PRN
Status: DISCONTINUED | OUTPATIENT
Start: 2024-09-03 | End: 2024-09-06 | Stop reason: HOSPADM

## 2024-09-03 RX ORDER — SODIUM CHLORIDE 9 MG/ML
INJECTION, SOLUTION INTRAVENOUS PRN
Status: DISCONTINUED | OUTPATIENT
Start: 2024-09-03 | End: 2024-09-06 | Stop reason: HOSPADM

## 2024-09-03 RX ORDER — ACETAMINOPHEN 650 MG/1
650 SUPPOSITORY RECTAL EVERY 6 HOURS PRN
Status: DISCONTINUED | OUTPATIENT
Start: 2024-09-03 | End: 2024-09-06 | Stop reason: HOSPADM

## 2024-09-03 RX ORDER — FUROSEMIDE 10 MG/ML
40 INJECTION INTRAMUSCULAR; INTRAVENOUS ONCE
Status: COMPLETED | OUTPATIENT
Start: 2024-09-03 | End: 2024-09-03

## 2024-09-03 RX ORDER — SODIUM CHLORIDE 0.9 % (FLUSH) 0.9 %
5-40 SYRINGE (ML) INJECTION EVERY 12 HOURS SCHEDULED
Status: DISCONTINUED | OUTPATIENT
Start: 2024-09-03 | End: 2024-09-06 | Stop reason: HOSPADM

## 2024-09-03 RX ORDER — POTASSIUM CHLORIDE 7.45 MG/ML
10 INJECTION INTRAVENOUS PRN
Status: DISCONTINUED | OUTPATIENT
Start: 2024-09-03 | End: 2024-09-06 | Stop reason: HOSPADM

## 2024-09-03 RX ORDER — ENOXAPARIN SODIUM 100 MG/ML
30 INJECTION SUBCUTANEOUS 2 TIMES DAILY
Status: DISCONTINUED | OUTPATIENT
Start: 2024-09-04 | End: 2024-09-06 | Stop reason: HOSPADM

## 2024-09-03 RX ORDER — 0.9 % SODIUM CHLORIDE 0.9 %
1000 INTRAVENOUS SOLUTION INTRAVENOUS ONCE
Status: COMPLETED | OUTPATIENT
Start: 2024-09-03 | End: 2024-09-03

## 2024-09-03 RX ORDER — POTASSIUM CHLORIDE 1500 MG/1
40 TABLET, EXTENDED RELEASE ORAL PRN
Status: DISCONTINUED | OUTPATIENT
Start: 2024-09-03 | End: 2024-09-06 | Stop reason: HOSPADM

## 2024-09-03 RX ORDER — FUROSEMIDE 10 MG/ML
40 INJECTION INTRAMUSCULAR; INTRAVENOUS 2 TIMES DAILY
Status: DISCONTINUED | OUTPATIENT
Start: 2024-09-04 | End: 2024-09-04

## 2024-09-03 RX ORDER — NITROGLYCERIN 0.4 MG/1
0.4 TABLET SUBLINGUAL EVERY 5 MIN PRN
Status: DISCONTINUED | OUTPATIENT
Start: 2024-09-03 | End: 2024-09-06 | Stop reason: HOSPADM

## 2024-09-03 RX ORDER — SODIUM CHLORIDE 0.9 % (FLUSH) 0.9 %
5-40 SYRINGE (ML) INJECTION PRN
Status: DISCONTINUED | OUTPATIENT
Start: 2024-09-03 | End: 2024-09-06 | Stop reason: HOSPADM

## 2024-09-03 RX ORDER — LEVOTHYROXINE SODIUM 50 UG/1
175 TABLET ORAL DAILY
Status: DISCONTINUED | OUTPATIENT
Start: 2024-09-04 | End: 2024-09-06 | Stop reason: HOSPADM

## 2024-09-03 RX ORDER — ACETAMINOPHEN 325 MG/1
650 TABLET ORAL EVERY 6 HOURS PRN
Status: DISCONTINUED | OUTPATIENT
Start: 2024-09-03 | End: 2024-09-06 | Stop reason: HOSPADM

## 2024-09-03 RX ORDER — POLYETHYLENE GLYCOL 3350 17 G/17G
17 POWDER, FOR SOLUTION ORAL DAILY PRN
Status: DISCONTINUED | OUTPATIENT
Start: 2024-09-03 | End: 2024-09-06 | Stop reason: HOSPADM

## 2024-09-03 RX ORDER — ONDANSETRON 4 MG/1
4 TABLET, ORALLY DISINTEGRATING ORAL EVERY 8 HOURS PRN
Status: DISCONTINUED | OUTPATIENT
Start: 2024-09-03 | End: 2024-09-06 | Stop reason: HOSPADM

## 2024-09-03 RX ADMIN — FUROSEMIDE 40 MG: 10 INJECTION, SOLUTION INTRAMUSCULAR; INTRAVENOUS at 22:22

## 2024-09-03 RX ADMIN — SODIUM CHLORIDE 1000 ML: 9 INJECTION, SOLUTION INTRAVENOUS at 21:31

## 2024-09-03 ASSESSMENT — LIFESTYLE VARIABLES
HOW OFTEN DO YOU HAVE A DRINK CONTAINING ALCOHOL: NEVER
HOW MANY STANDARD DRINKS CONTAINING ALCOHOL DO YOU HAVE ON A TYPICAL DAY: PATIENT DOES NOT DRINK

## 2024-09-04 LAB
ANION GAP SERPL CALCULATED.3IONS-SCNC: 12 MMOL/L (ref 3–16)
BASOPHILS # BLD: 0 K/UL (ref 0–0.2)
BASOPHILS NFR BLD: 0.4 %
BUN SERPL-MCNC: 50 MG/DL (ref 7–20)
CALCIUM SERPL-MCNC: 7.3 MG/DL (ref 8.3–10.6)
CHLORIDE SERPL-SCNC: 102 MMOL/L (ref 99–110)
CHOLEST SERPL-MCNC: 160 MG/DL (ref 0–199)
CO2 SERPL-SCNC: 22 MMOL/L (ref 21–32)
CREAT SERPL-MCNC: 1.2 MG/DL (ref 0.6–1.1)
DEPRECATED RDW RBC AUTO: 16.5 % (ref 12.4–15.4)
EKG ATRIAL RATE: 100 BPM
EKG DIAGNOSIS: NORMAL
EKG P AXIS: 43 DEGREES
EKG P-R INTERVAL: 156 MS
EKG Q-T INTERVAL: 360 MS
EKG QRS DURATION: 88 MS
EKG QTC CALCULATION (BAZETT): 464 MS
EKG R AXIS: 31 DEGREES
EKG T AXIS: 35 DEGREES
EKG VENTRICULAR RATE: 100 BPM
EOSINOPHIL # BLD: 0.3 K/UL (ref 0–0.6)
EOSINOPHIL NFR BLD: 3.8 %
GFR SERPLBLD CREATININE-BSD FMLA CKD-EPI: 56 ML/MIN/{1.73_M2}
GLUCOSE SERPL-MCNC: 117 MG/DL (ref 70–99)
HCT VFR BLD AUTO: 27.7 % (ref 36–48)
HDLC SERPL-MCNC: 25 MG/DL (ref 40–60)
HGB BLD-MCNC: 8.9 G/DL (ref 12–16)
INR PPP: 1.16 (ref 0.85–1.15)
IRON SATN MFR SERPL: 5 % (ref 15–50)
IRON SERPL-MCNC: 11 UG/DL (ref 37–145)
LDLC SERPL CALC-MCNC: 109 MG/DL
LYMPHOCYTES # BLD: 1 K/UL (ref 1–5.1)
LYMPHOCYTES NFR BLD: 11.1 %
MAGNESIUM SERPL-MCNC: 2.5 MG/DL (ref 1.8–2.4)
MCH RBC QN AUTO: 22.4 PG (ref 26–34)
MCHC RBC AUTO-ENTMCNC: 31.9 G/DL (ref 31–36)
MCV RBC AUTO: 70.2 FL (ref 80–100)
MONOCYTES # BLD: 1.1 K/UL (ref 0–1.3)
MONOCYTES NFR BLD: 12.3 %
NEUTROPHILS # BLD: 6.4 K/UL (ref 1.7–7.7)
NEUTROPHILS NFR BLD: 72.4 %
PLATELET # BLD AUTO: 373 K/UL (ref 135–450)
PMV BLD AUTO: 7.7 FL (ref 5–10.5)
POTASSIUM SERPL-SCNC: 3.9 MMOL/L (ref 3.5–5.1)
PROTHROMBIN TIME: 15 SEC (ref 11.9–14.9)
RBC # BLD AUTO: 3.95 M/UL (ref 4–5.2)
SODIUM SERPL-SCNC: 136 MMOL/L (ref 136–145)
TIBC SERPL-MCNC: 219 UG/DL (ref 260–445)
TRIGL SERPL-MCNC: 132 MG/DL (ref 0–150)
TROPONIN, HIGH SENSITIVITY: 7 NG/L (ref 0–14)
TROPONIN, HIGH SENSITIVITY: 8 NG/L (ref 0–14)
TSH SERPL DL<=0.005 MIU/L-ACNC: 3.72 UIU/ML (ref 0.27–4.2)
VLDLC SERPL CALC-MCNC: 26 MG/DL
WBC # BLD AUTO: 8.8 K/UL (ref 4–11)

## 2024-09-04 PROCEDURE — 96372 THER/PROPH/DIAG INJ SC/IM: CPT

## 2024-09-04 PROCEDURE — 6370000000 HC RX 637 (ALT 250 FOR IP): Performed by: STUDENT IN AN ORGANIZED HEALTH CARE EDUCATION/TRAINING PROGRAM

## 2024-09-04 PROCEDURE — 6370000000 HC RX 637 (ALT 250 FOR IP): Performed by: INTERNAL MEDICINE

## 2024-09-04 PROCEDURE — 80061 LIPID PANEL: CPT

## 2024-09-04 PROCEDURE — G0378 HOSPITAL OBSERVATION PER HR: HCPCS

## 2024-09-04 PROCEDURE — 83735 ASSAY OF MAGNESIUM: CPT

## 2024-09-04 PROCEDURE — 84443 ASSAY THYROID STIM HORMONE: CPT

## 2024-09-04 PROCEDURE — 84484 ASSAY OF TROPONIN QUANT: CPT

## 2024-09-04 PROCEDURE — 1200000000 HC SEMI PRIVATE

## 2024-09-04 PROCEDURE — 6360000002 HC RX W HCPCS: Performed by: INTERNAL MEDICINE

## 2024-09-04 PROCEDURE — 97116 GAIT TRAINING THERAPY: CPT

## 2024-09-04 PROCEDURE — 85025 COMPLETE CBC W/AUTO DIFF WBC: CPT

## 2024-09-04 PROCEDURE — 85610 PROTHROMBIN TIME: CPT

## 2024-09-04 PROCEDURE — 97530 THERAPEUTIC ACTIVITIES: CPT

## 2024-09-04 PROCEDURE — 99222 1ST HOSP IP/OBS MODERATE 55: CPT | Performed by: STUDENT IN AN ORGANIZED HEALTH CARE EDUCATION/TRAINING PROGRAM

## 2024-09-04 PROCEDURE — 83550 IRON BINDING TEST: CPT

## 2024-09-04 PROCEDURE — 36415 COLL VENOUS BLD VENIPUNCTURE: CPT

## 2024-09-04 PROCEDURE — 93010 ELECTROCARDIOGRAM REPORT: CPT | Performed by: INTERNAL MEDICINE

## 2024-09-04 PROCEDURE — 2580000003 HC RX 258: Performed by: INTERNAL MEDICINE

## 2024-09-04 PROCEDURE — 97161 PT EVAL LOW COMPLEX 20 MIN: CPT

## 2024-09-04 PROCEDURE — 83540 ASSAY OF IRON: CPT

## 2024-09-04 PROCEDURE — 80048 BASIC METABOLIC PNL TOTAL CA: CPT

## 2024-09-04 RX ORDER — LOSARTAN POTASSIUM AND HYDROCHLOROTHIAZIDE 25; 100 MG/1; MG/1
1 TABLET ORAL DAILY
Status: ON HOLD | COMMUNITY
End: 2024-09-06 | Stop reason: HOSPADM

## 2024-09-04 RX ORDER — CARVEDILOL 3.12 MG/1
3.12 TABLET ORAL 2 TIMES DAILY WITH MEALS
Status: DISCONTINUED | OUTPATIENT
Start: 2024-09-04 | End: 2024-09-06 | Stop reason: HOSPADM

## 2024-09-04 RX ORDER — CLONIDINE HYDROCHLORIDE 0.1 MG/1
0.1 TABLET ORAL 2 TIMES DAILY
Status: DISCONTINUED | OUTPATIENT
Start: 2024-09-04 | End: 2024-09-06 | Stop reason: HOSPADM

## 2024-09-04 RX ORDER — FERROUS SULFATE 325(65) MG
325 TABLET ORAL 2 TIMES DAILY WITH MEALS
Status: DISCONTINUED | OUTPATIENT
Start: 2024-09-04 | End: 2024-09-06 | Stop reason: HOSPADM

## 2024-09-04 RX ORDER — AMLODIPINE BESYLATE 5 MG/1
5 TABLET ORAL DAILY
Status: ON HOLD | COMMUNITY
End: 2024-09-06 | Stop reason: HOSPADM

## 2024-09-04 RX ADMIN — ACETAMINOPHEN 650 MG: 325 TABLET ORAL at 22:38

## 2024-09-04 RX ADMIN — FERROUS SULFATE TAB 325 MG (65 MG ELEMENTAL FE) 325 MG: 325 (65 FE) TAB at 12:40

## 2024-09-04 RX ADMIN — FERROUS SULFATE TAB 325 MG (65 MG ELEMENTAL FE) 325 MG: 325 (65 FE) TAB at 19:08

## 2024-09-04 RX ADMIN — CLONIDINE HYDROCHLORIDE 0.1 MG: 0.1 TABLET ORAL at 12:40

## 2024-09-04 RX ADMIN — LEVOTHYROXINE SODIUM 175 MCG: 0.05 TABLET ORAL at 06:54

## 2024-09-04 RX ADMIN — SODIUM CHLORIDE, PRESERVATIVE FREE 10 ML: 5 INJECTION INTRAVENOUS at 21:47

## 2024-09-04 RX ADMIN — CLONIDINE HYDROCHLORIDE 0.1 MG: 0.1 TABLET ORAL at 21:14

## 2024-09-04 RX ADMIN — ENOXAPARIN SODIUM 30 MG: 100 INJECTION SUBCUTANEOUS at 09:33

## 2024-09-04 RX ADMIN — ENOXAPARIN SODIUM 30 MG: 100 INJECTION SUBCUTANEOUS at 21:14

## 2024-09-04 RX ADMIN — CARVEDILOL 3.12 MG: 3.12 TABLET, FILM COATED ORAL at 19:08

## 2024-09-04 ASSESSMENT — PAIN SCALES - GENERAL
PAINLEVEL_OUTOF10: 0
PAINLEVEL_OUTOF10: 3
PAINLEVEL_OUTOF10: 0

## 2024-09-04 ASSESSMENT — PAIN - FUNCTIONAL ASSESSMENT: PAIN_FUNCTIONAL_ASSESSMENT: PREVENTS OR INTERFERES SOME ACTIVE ACTIVITIES AND ADLS

## 2024-09-04 ASSESSMENT — PAIN DESCRIPTION - LOCATION: LOCATION: LEG

## 2024-09-04 ASSESSMENT — PAIN DESCRIPTION - DESCRIPTORS: DESCRIPTORS: ACHING

## 2024-09-04 ASSESSMENT — PAIN DESCRIPTION - ORIENTATION: ORIENTATION: MID

## 2024-09-04 NOTE — PROGRESS NOTES
Occupational Therapy  Per patient and PT, pt is at baseline fxn. Pt reports no concerns with ADLs or fxl mobility. Please re order if new needs arise. No charge    Ml Johns, OTR/L

## 2024-09-04 NOTE — DISCHARGE INSTRUCTIONS
Heart Failure Resources:  Heart Failure Interactive Workbook:  Go to [email or web address]://[email or web address].Snaptee/publication/?o=867880 for a Free Heart Failure Interactive Workbook provided by The American Heart Association. This interactive workbook will provide information on Healthier Living with Heart Failure. Please copy and paste link into search bar. Use your mouse to scroll through the pages.    HF [place] callie:   Heart Failure Free smart phone callie available for iPhone and Android download. Use your phone to track sodium intake, fluid intake, symptoms, and weight.     Low Sodium Diet / Recipes:  Go to www.[employer] website for “renal” diet which is Low Sodium! [employer] is a dialysis company, but this website offers free seasonal cookbooks. Each quarter, they will release 25-30 new recipes with a breakdown of calories, sodium, and glucose. You can also go to www.[employer]/recipes website for free recipes.       Home Exercise Program:   Identification of Green/Yellow/Red zones:  You should be able to identify when you feel good (green zone), if you have 1-2 symptoms of HF (yellow zone), or if you are in need of medical attention (red zone).  In your CHF education folder you were provided a “stop light tool” to outline this information.     We want to you to rate your exertion levels:    Our therapy team has discussed means of identification with you such as the \"Mani scale.\"  The Mani rating scale ranges from 6 to 20, where 6 means \"no exertion at all\" and 20 means \"maximal exertion.\" The goal is to use this to gauge how much effort it is taking for you to do your normal daily tasks.   You should be able to recognize when too much exertion is being expended.    Elements of Energy Conservation:   Prioritize/Plan: Decide what needs to be done today, and what can wait for a later date, write to do lists, plan ahead to avoid extra trips, and gather supplies and equipment needed before starting an activity.   Position:

## 2024-09-04 NOTE — CONSULTS
CARDIOLOGY CONSULTATION        Patient Name: Court Rascon  Date of admission: 9/3/2024  8:32 PM  Admission Dx: RUI (acute kidney injury) (HCC) [N17.9]  Edema, lower extremity [R60.0]  Congestive heart failure, unspecified HF chronicity, unspecified heart failure type (HCC) [I50.9]  Requesting Physician: Alba Silver MD  Primary Care physician: Christine Wesley APRN - CNP    Reason for Consultation/Chief Complaint: LE edema/HF    History of Present Illness:     Court Rascon is a 44 y.o. patient with past medical history of iron deficiency anemia, lower extremity edema, hypertension, history of Bell's palsy with residual left-sided facial deficits, history of thyroid disease status post thyroidectomy, morbid obesity who presented to the hospital with complaints of fatigue and lower extremity edema.  Patient recently seen cardiology at [facility] who recommended patient be evaluated in the hospital.  Patient was admitted on 8/20/2024 for concerns of acute decompensated heart failure.  Echo showed preserved EF, diastolic function and no significant valvular disease.  RV was normal size and with normal function.  She was started on losartan HCTZ as well as daily furosemide.  Patient states she was doing okay but then she had worsening fatigue and still had lower extremity edema.  Patient states prior history of angioedema to ACE inhibitor.  Patient endorses she is on her period and has heavy menstrual cycles with clotting, chronic.  Has not seen gynecology.  Endorses some dyspnea on exertion.  Patient endorses snoring as well as his spouse.  Daytime somnolence.    The patient denies chest pain, shortness of breath at rest, Denies palpitations, dizziness, near-syncope or hi syncope. Denies paroxysmal nocturnal dyspnea, orthopnea, bendopnea, increasing lower extremity edema.    Remote smoker, 2 cigarettes/day for 10 years.  Quit 10 years ago.  Does not drink or use recreational drugs.  History of    Diagnosis    Status post induction of labor    Edema, lower extremity         I will address the patient's cardiac risk factors and adjusted pharmacologic treatment as needed. In addition, I have reinforced the need for patient directed risk factor modification.  All questions and concerns were addressed to the patient/family. Alternatives to my treatment were discussed.     Thank you for allowing us to participate in the care of Court Rascon. Please call me with any questions [phone].    Dwayne Osorio, DO   Cardiovascular Disease  [facility]  [phone] [place] Office  [phone] [place] Office  9/4/2024 4:24 PM

## 2024-09-04 NOTE — CONSULTS
Nutrition Note    CHF Nutrition Education    Consult received for heart failure diet education.  Education deferred or not appropriate at this time related to  pt has been meeting with RD in [facility] and talks about healthy choices and does 1:1 counseling .      Electronically signed by Preeti Gutierrez RD, LD on 9/4/2024 at 2:28 PM      Electronically signed by Preeti Gutierrez RD, LD on 9/4/24 at 2:28 PM EDT    Contact: [phone]

## 2024-09-04 NOTE — ED NOTES
Court Rascon is a 44 y.o. female admitted for  Principal Problem:    Edema, lower extremity  Resolved Problems:    * No resolved hospital problems. *  .   Patient Home via self with   Chief Complaint   Patient presents with    Abnormal Lab     Pt reports she got labs drawn on Thursday (Hgb 9.8, Hct 32.3, Iron 7). Pt reports she feels very dehydrated, and has been sleeping a lot. Pt states she recently was on her period and was bleeding a lot.    .  Patient is alert and Person, Place, Time, and Situation  Patient's baseline mobility: Baseline Mobility: Independent   Code Status: Full Code   Cardiac Rhythm:       Is patient on baseline Oxygen: no how many Liters:   Abnormal Assessment Findings:     Isolation: None      NIH Score:    C-SSRS: Risk of Suicide: No Risk  Bedside swallow:        Active LDA's:   Peripheral IV 09/03/24 Left;Proximal Forearm (Active)     Patient admitted with a mckeon: no If the mckeon is chronic was it exchanged:  Reason for mckeon:   Patient admitted with Central Line:  . PICC line placement confirmed: YES OR NO:[id number]}   Reason for Central line:   Was central line Inserted from an outside facility:        Family/Caregiver Present no Any Concerns: no   Restraints no  Sitter no         Vitals:      Vitals:    09/03/24 2042   BP: (!) 177/87   Pulse: 92   Resp: 18   Temp: 99.1 °F (37.3 °C)   TempSrc: Oral   SpO2: 99%       Last documented pain score (0-10 scale)    Pain medication administered .    Pertinent or High Risk Medications/Drips: No.    Pending Blood Product Administration: no    Abnormal labs:   Abnormal Labs Reviewed   CBC WITH AUTO DIFFERENTIAL - Abnormal; Notable for the following components:       Result Value    Hemoglobin 9.3 (*)     Hematocrit 29.0 (*)     MCV 70.7 (*)     MCH 22.7 (*)     RDW 16.9 (*)     Lymphocytes Absolute 0.9 (*)     All other components within normal limits   COMPREHENSIVE METABOLIC PANEL W/ REFLEX TO MG FOR LOW K - Abnormal; Notable for the following

## 2024-09-04 NOTE — ED PROVIDER NOTES
I did not personally evaluate this patient but I was asked to review the EKG.     EKG  The Ekg interpreted by myself in the emergency department in the absence of a cardiologist.  normal sinus rhythm with a rate of 100  Axis is   Normal  QTc is  within an acceptable range  Intervals and Durations are unremarkable.      No specific ST-T wave changes appreciated.  No evidence of acute ischemia.   No significant change from prior EKG dated 8/15/2024     Nathaniel Charles MD  09/03/24 0313

## 2024-09-04 NOTE — H&P
Hospital Medicine History & Physical      PCP: Christine Wesley APRN - CNP    Date of Admission: 9/3/2024    Date of Service: Pt seen/examined and Admitted with expected LOS greater than two midnights due to medical therapy.     Chief Complaint:      Worsening shortness of breath and lower extremity edema  History Of Present Illness:      This is a 44-year-old female who presented to the emergency room with a chief complaint of having lower extremity edema and worsening of shortness of breath.    Patient denies any chest pain no fever no chills no recent upper respiratory infection.      The workup in the ED notable for:  Elevated proBNP of 742  Hide point ischemia 131  Elevated BUN/creatinine level 56/1.3 GFR 51 it was normal in the range of 70s      Twelve-lead EKG reviewed by me shows normal sinus rhythm with a rate of 100 bpm no acute ST-T changes no ST-T elevation or depression no acute ischemic changes Axis is normal QTc within normal level    Laboratory workup  Patient past medical history significant for:  Past Medical History:          Diagnosis Date    Bell's palsy     Goiter, simple     Morbid obesity (HCC)        Past Surgical History:          Procedure Laterality Date    KNEE SURGERY      in 6th grade left knee    TONSILLECTOMY      when 8 yrs old       Medications Prior to Admission:      Prior to Admission medications    Medication Sig Start Date End Date Taking? Authorizing Provider   losartan-hydroCHLOROthiazide (HYZAAR) 100-25 MG per tablet Take 1 tablet by mouth daily   Yes Bill Ornelas MD   amLODIPine (NORVASC) 5 MG tablet Take 1 tablet by mouth daily   Yes Bill Ornelas MD   topiramate (TOPAMAX) 25 MG tablet Take 2 tablets by mouth 2 times daily   Yes ProviderBill MD   Cholecalciferol (VITAMIN D) 50 MCG (2000 UT) CAPS capsule Take by mouth   Yes Bill Ornelas MD   furosemide (LASIX) 20 MG tablet Take 1 tablet by mouth daily 8/15/24  Yes Gonzalez Clark,

## 2024-09-04 NOTE — PROGRESS NOTES
Dr. Silver notified of the following: \"Patient has two additional home meds that weren't on her list. She also takes Losartan-HCTZ 100-25 once daily and Amlodipine 5 mg once daily.\"

## 2024-09-04 NOTE — PROGRESS NOTES
Physical Therapy  Facility/Department: [place] [address] [facility]  Physical Therapy Initial Assessment/Discharge Summary (1x only)    Name: Court Rascon  : 1980  MRN: [id number]  Date of Service: 2024    Discharge Recommendations:  Home with assist PRN   PT Equipment Recommendations  Equipment Needed: No      Patient Diagnosis(es): The primary encounter diagnosis was RUI (acute kidney injury) (HCC). A diagnosis of Congestive heart failure, unspecified HF chronicity, unspecified heart failure type (HCC) was also pertinent to this visit.  Past Medical History:  has a past medical history of Bell's palsy, Goiter, simple, and Morbid obesity (HCC).  Past Surgical History:  has a past surgical history that includes knee surgery and Tonsillectomy.    Assessment  Assessment: Pt referred for PT evaluation during current hospital stay with dx of LE edema, weakness, and dehydration.  Pt currently functioning at her functional baseline, demonstrating (I) with all functional mobility tasks including gait without AD.  Pt provided with PT heart failure education with pt verbalizing good understanding.  Pt reports her recent echo was negative for evidence of CHF, so focused on areas such as therapeutic exercise and tracking daily weight with education today, as these appear to be more applicable to pt's individual case.  Pt without further acute PT needs at this time; will sign off.  Recommend pt return home independently once cleared medically by physician(s); no further acute PT needs at this time.  Therapy Prognosis: Good  Decision Making: Low Complexity  No Skilled PT: At baseline function  Requires PT Follow-Up: No  Activity Tolerance: Patient tolerated evaluation without incident;Patient tolerated treatment well    Plan  Physical Therapy Plan: Discharge with evaluation only  Safety Devices: Call light within reach, Left in chair, Nurse notified    Restrictions  Restrictions/Precautions  Restrictions/Precautions:  decelerate activity levels to reach the desired range.    The patient should appraise their feeling of exertion as honestly as possible, without thinking about what the actual physical load is. Their feeling of effort and exertion is important, and it should not be compared to the level of others. Patient's should target exercises for very light to light (9-11/20) for this phase of their rehab.     Mani RPE Scale    Activity Completed:  Amb x 300 feet without AD    []6  No exertion at all  [x]7  Extremely light  []8  [] 9  Very light (For a healthy person, it is like walking slowly at his or her own pace for some minutes)  []10  []11  Light  []12  []13  Somewhat hard (exercise, but it still feels OK to continue)  []14  []15  Hard (heavy)  []16  []17  Very hard (can still go on, but really has to push himself. It feels very heavy, and the person is very tired.)[]18  []19  Extremely hard (For most people this is the most strenuous exercise they have ever experienced.)  []20  Maximal exertion.    3) Pt educated in and completed Therapeutic exercise (as indicated below for 1 set) to promote circulation and prevent complications of bedrest with patient verbalizes understanding of employing green zone, yellow zone and red zone to seek provider input and evaluation.  Educated patient in :  []Supine    Level 1: Bed Exercise 10-15 reps, 2x/day  []Ankle Pumps  []Heel Slides  []Hip Abduction  []Buttocks Squeeze  []Diaphragmatic Breathing with TA set  []Shoulder Shrugs  []Bicep Curl  []Hands open/close             [x]Sitting - briefly reviewed seated exercises, pt states she can perform them on her own  Level 2: Seated Exercises 10-15 reps, 2x/day  []Toe raise/heel raise  []Long Arc Quad  []Seated March  []Seated Clamshell  []Diaphragmatic Breathing with TA set and BUE ER and IR  []Shoulder Shrugs  []Bicep Curls  []Hands open/close                         []Standing   Level 3: Standing Exercises 10-15 reps, 2x/day     []Sit

## 2024-09-04 NOTE — PROGRESS NOTES
Hospital Medicine Progress Note      Date of Admission: 9/3/2024  Hospital Day: 2    Chief Admission Complaint:  Worsening shortness of breath and lower extremity edema      Subjective: c/o   sob with exertion , extreme tiredness , leg swelling with  Norvasc which was started few days ago .  H/o heavy PV bleeding monthly   No cough or change in MS  Poor appetite , no N or V or D    Presenting Admission History:       This is a 44-year-old female who presented to the emergency room with a chief complaint of having lower extremity edema and worsening of shortness of breath.     Patient denies any chest pain no fever no chills no recent upper respiratory infection.       Assessment/Plan:      Current Principal Problem:  Edema, lower extremity    Extreme tiredness shortness of breath most probably secondary to microcytic anemia-/secondary to iron deficiency following heavy menstrual bleeding  Patient denies NE bleed, stool guaiac pending  -Iron supplements,   advised GYN follow-up as an outpatient  Given history of hypothyroidism and thyroidectomy check TSH level    Nonpitting edema possibly secondary to Norvasc-  She was started on losartan and hydrochlorothiazide for high blood pressure at [facility] month ago and Norvasc was added week ago  Currently holding   Recent echocardiogram with ejection fraction within normal limit no diastolic dysfunction  Chest x-ray within normal limits no evidence of fluid overload   Congestive heart failure unlikely    RUI-hold nephrotoxic medications and monitor BMP encourage p.o. fluid, Lasix was started upon admission -discontinued  Patient does not have CHF    History of left Bell's palsy    Morbid obesity-counseled    Anxiety    Physical Exam Performed:      General appearance:  No apparent distress, anxious morbid obesity  Respiratory:  Normal respiratory effort.   Cardiovascular:  Regular rate and rhythm.  Abdomen:  Soft, non-tender, non-distended.  Musculoskeletal:  No  coagulation factors  [] Other -   [] Change in code status:    [] Decision to escalate care:    [] Major surgery/procedure with associated risk factors:    ----------------------------------------------------------------------  C. Data (any 2)  [x] Discussed current management and discharge planning options with Case Management.  [] Discussed management of the case with:    [] Telemetry personally reviewed and interpreted as documented above    [] Imaging personally reviewed and interpreted, includes:    [x] Data Review (any 3)  [x] All available Consultant notes from yesterday/today were reviewed  [x] All current labs were reviewed and interpreted for clinical significance   [x] Appropriate follow-up labs were ordered  [] Collateral history obtained from:        Medications:  Personally reviewed in detail in conjunction w/ labs as documented for evidence of drug toxicity.     Infusion Medications    sodium chloride       Scheduled Medications    levothyroxine  175 mcg Oral Daily    sodium chloride flush  5-40 mL IntraVENous 2 times per day    enoxaparin  30 mg SubCUTAneous BID    furosemide  40 mg IntraVENous BID     PRN Meds: sodium chloride flush, sodium chloride, ondansetron **OR** ondansetron, polyethylene glycol, acetaminophen **OR** acetaminophen, potassium chloride **OR** potassium alternative oral replacement **OR** potassium chloride, magnesium sulfate, nitroGLYCERIN     Labs:  Personally reviewed and interpreted for clinical significance.     Recent Labs     09/03/24 2057 09/04/24  0544   WBC 9.9 8.8   HGB 9.3* 8.9*   HCT 29.0* 27.7*    373     Recent Labs     09/03/24 2057 09/04/24  0544   * 136   K 4.2 3.9   CL 99 102   CO2 23 22   BUN 56* 50*   CREATININE 1.3* 1.2*   CALCIUM 7.6* 7.3*   MG  --  2.50*     Recent Labs     09/03/24 2057 09/04/24  0159 09/04/24  0544   PROBNP 742*  --   --    TROPHS 7 7 8     No results for input(s): \"LABA1C\" in the last 72 hours.  Recent Labs

## 2024-09-04 NOTE — ED PROVIDER NOTES
Emergency Department Attending Provider Note  Location: [facility]  ED  9/3/2024     Patient Identification  Court Rascon is a 44 y.o. female      Court Rascon was evaluated in the Emergency Department for shortness of breath and chest tightness. Although initial history and physical exam information was obtained by Alonzo SOLANO (who also dictated a record of this visit), I personally saw the patient and performed a substantive portion of the visit including all aspects of the medical decision making.    Patient seen and evaluated.  Relevant records reviewed.  Patient is a 44-year-old female with history of hypertension who presents with ongoing exertional shortness of breath and chest tightness.  She also reports that she had labs drawn on Thursday which were notable for hemoglobin of 9.8, hematocrit of 32.3 and iron of 7.  Patient reports that she has had blood pressure medication changes.  She was recently admitted to [facility] where she had a cardiac workup and there was no evidence of significant congestive heart failure.  She reports she feels dehydrated    Patient presented hypertensive but vitals otherwise unremarkable.  On exam she is obese.  She has mild edema in bilateral lower extremities.  She has clear breath sounds bilaterally    Chronic medical conditions reviewed  Social determinants reviewed    Diagnostic studies reviewed and interpreted  CBC within normal white blood cell count, microcytic anemia with a hemoglobin of 9.3, hematocrit of 29, normal platelets  CMP with hyponatremia to 131, RUI with a BUN of 56, creatinine of 1.3 and GFR 51, low calcium at 7.6, normal LFTs and bilirubin  Elevated proBNP to 742  Normal troponin at 7  Chest x-ray with no pulmonary edema or effusions    Cardiology at [facility] consulted.  Recommending admission at this time.  Patient updated with plan and is amenable for admission.    Exclusion criteria - the patient is NOT to be included for SEP-1

## 2024-09-04 NOTE — CARE COORDINATION
Chart reviewed, PT recommending home with assist PRN, no DME need.  Pt from home with family, IPTA, works full time, has PCP and specialists.  No needs noted prior to admission and no discharge needs noted at this time.  Please notify CM if needs arise.  REBECCA Lucas-RN

## 2024-09-04 NOTE — ED PROVIDER NOTES
[facility] Emergency Department    CHIEF COMPLAINT  Abnormal Lab (Pt reports she got labs drawn on Thursday (Hgb 9.8, Hct 32.3, Iron 7). Pt reports she feels very dehydrated, and has been sleeping a lot. Pt states she recently was on her period and was bleeding a lot. )      SHARED SERVICE VISIT  I have seen and evaluated this patient with my supervising physician, Dr. Eva Cabrera.    HISTORY OF PRESENT ILLNESS  Court Rascon is a 44 y.o. female who presents to the ED complaining of shortness of breath, bilateral lower extremity edema, and chest discomfort.  She was recently diagnosed with CHF is experiencing pitting edema lower extremities.  She has noticed for the past couple days she has extremely short of breath and feeling some chest discomfort.  Has been taking her medication as prescribed, however does not feel like the edema is getting any better.  She is also concerned for being dehydrated, however denies any decrease in p.o. intake.Denies any headache, body ache, fevers or chills.  Denies any coughing or sneezing.  Denies any sore throat or congestion.  Denies any vision changes or dizziness. Denies any nausea, vomiting, or abdominal pain.  Denies any urinary symptoms.  Denies any diarrhea or bloody stools.  Denies any new onset back pain.  Denies any recent travel or sick contacts.    No other complaints, modifying factors or associated symptoms.     Nursing notes reviewed.   Past Medical History:   Diagnosis Date    Bell's palsy     Goiter, simple     Morbid obesity (HCC)      Past Surgical History:   Procedure Laterality Date    KNEE SURGERY      in 6th grade left knee    TONSILLECTOMY      when 8 yrs old     Family History   Problem Relation Age of Onset    Heart Disease Mother     Stroke Mother     Cancer Mother     Heart Disease Maternal Grandmother     Heart Disease Maternal Grandfather     Heart Disease Maternal Aunt     Breast Cancer Maternal Aunt   Outpatient Medications   Medication Sig Dispense Refill    topiramate (TOPAMAX) 25 MG tablet Take 2 tablets by mouth 2 times daily      Cholecalciferol (VITAMIN D) 50 MCG (2000 UT) CAPS capsule Take by mouth      furosemide (LASIX) 20 MG tablet Take 1 tablet by mouth daily 30 tablet 3    levothyroxine (SYNTHROID) 175 MCG tablet Take 1 tablet by mouth Daily      lisinopril (PRINIVIL;ZESTRIL) 10 MG tablet Take 1 tablet by mouth daily      meloxicam (MOBIC) 15 MG tablet Take 1 tablet by mouth daily as needed for Pain 30 tablet 0    dexamethasone (DECADRON) 2 MG tablet Take all five tablets at one time on 7/24/2016 5 tablet 0     Allergies   Allergen Reactions    Codeine Nausea And Vomiting       REVIEW OF SYSTEMS  10 systems reviewed, pertinent positives per HPI otherwise noted to be negative    PHYSICAL EXAM  BP (!) 177/87   Pulse 92   Temp 99.1 °F (37.3 °C) (Oral)   Resp 18   LMP 08/02/2024   SpO2 99%   GENERAL APPEARANCE: Awake and alert. Cooperative.  No acute distress.  Nontoxic in appearance.  HEAD: Normocephalic. Atraumatic.  No celaya signs or raccoon eyes.  EYES: PERRL. EOM's grossly intact.  No conjunctival injection or discharge.  No icterus.  ENT: Mucous membranes are pink and moist.   NECK: Supple.  Full range of motion with no neck pain or stiffness.  HEART: RRR. No murmurs, rubs or gallops.  Normal S1-S2.  No S3 or S4.  No tenderness palpation of chest wall.  LUNGS: Respirations unlabored. CTAB. Good air exchange. Speaking comfortably in full sentences.   ABDOMEN: Soft. Non-distended. Non-tender. No guarding or rebound.   No masses. No organomegaly.   EXTREMITIES: 2+ pitting edema lower extremities.  Moves all extremities equally. All extremities neurovascularly intact.   SKIN: Warm and dry. No acute rashes.   NEUROLOGICAL: Alert and oriented. CN's 2-12 intact. No gross facial drooping. Strength 5/5, sensation intact.   PSYCHIATRIC: Normal mood and affect.    RADIOLOGY  XR CHEST (2 VW)    Result

## 2024-09-04 NOTE — CONSULTS
Called [facility] cardiology @ 0721  PER: Alonzo Hannah PA-C  RE: SOB with elevated BNP and RUI  Shamar Gold MD responded @ 8389

## 2024-09-05 LAB
ANION GAP SERPL CALCULATED.3IONS-SCNC: 9 MMOL/L (ref 3–16)
BUN SERPL-MCNC: 50 MG/DL (ref 7–20)
CALCIUM SERPL-MCNC: 7.1 MG/DL (ref 8.3–10.6)
CHLORIDE SERPL-SCNC: 103 MMOL/L (ref 99–110)
CO2 SERPL-SCNC: 23 MMOL/L (ref 21–32)
CREAT SERPL-MCNC: 1.3 MG/DL (ref 0.6–1.1)
GFR SERPLBLD CREATININE-BSD FMLA CKD-EPI: 51 ML/MIN/{1.73_M2}
GLUCOSE SERPL-MCNC: 99 MG/DL (ref 70–99)
MAGNESIUM SERPL-MCNC: 2.6 MG/DL (ref 1.8–2.4)
POTASSIUM SERPL-SCNC: 4.2 MMOL/L (ref 3.5–5.1)
SODIUM SERPL-SCNC: 135 MMOL/L (ref 136–145)

## 2024-09-05 PROCEDURE — 96372 THER/PROPH/DIAG INJ SC/IM: CPT

## 2024-09-05 PROCEDURE — 83735 ASSAY OF MAGNESIUM: CPT

## 2024-09-05 PROCEDURE — 1200000000 HC SEMI PRIVATE

## 2024-09-05 PROCEDURE — 96366 THER/PROPH/DIAG IV INF ADDON: CPT

## 2024-09-05 PROCEDURE — 6370000000 HC RX 637 (ALT 250 FOR IP): Performed by: STUDENT IN AN ORGANIZED HEALTH CARE EDUCATION/TRAINING PROGRAM

## 2024-09-05 PROCEDURE — 6370000000 HC RX 637 (ALT 250 FOR IP): Performed by: INTERNAL MEDICINE

## 2024-09-05 PROCEDURE — 2580000003 HC RX 258: Performed by: INTERNAL MEDICINE

## 2024-09-05 PROCEDURE — 6360000002 HC RX W HCPCS: Performed by: INTERNAL MEDICINE

## 2024-09-05 PROCEDURE — 96365 THER/PROPH/DIAG IV INF INIT: CPT

## 2024-09-05 PROCEDURE — G0378 HOSPITAL OBSERVATION PER HR: HCPCS

## 2024-09-05 PROCEDURE — 36415 COLL VENOUS BLD VENIPUNCTURE: CPT

## 2024-09-05 PROCEDURE — 80048 BASIC METABOLIC PNL TOTAL CA: CPT

## 2024-09-05 RX ORDER — NIFEDIPINE 30 MG/1
30 TABLET, EXTENDED RELEASE ORAL DAILY
Status: DISCONTINUED | OUTPATIENT
Start: 2024-09-05 | End: 2024-09-06

## 2024-09-05 RX ADMIN — ENOXAPARIN SODIUM 30 MG: 100 INJECTION SUBCUTANEOUS at 09:10

## 2024-09-05 RX ADMIN — FERROUS SULFATE TAB 325 MG (65 MG ELEMENTAL FE) 325 MG: 325 (65 FE) TAB at 09:09

## 2024-09-05 RX ADMIN — ENOXAPARIN SODIUM 30 MG: 100 INJECTION SUBCUTANEOUS at 20:26

## 2024-09-05 RX ADMIN — SODIUM CHLORIDE, PRESERVATIVE FREE 10 ML: 5 INJECTION INTRAVENOUS at 09:10

## 2024-09-05 RX ADMIN — CLONIDINE HYDROCHLORIDE 0.1 MG: 0.1 TABLET ORAL at 20:26

## 2024-09-05 RX ADMIN — CARVEDILOL 3.12 MG: 3.12 TABLET, FILM COATED ORAL at 09:09

## 2024-09-05 RX ADMIN — LEVOTHYROXINE SODIUM 175 MCG: 0.05 TABLET ORAL at 04:30

## 2024-09-05 RX ADMIN — CLONIDINE HYDROCHLORIDE 0.1 MG: 0.1 TABLET ORAL at 09:09

## 2024-09-05 RX ADMIN — CARVEDILOL 3.12 MG: 3.12 TABLET, FILM COATED ORAL at 17:55

## 2024-09-05 RX ADMIN — SODIUM CHLORIDE 200 MG: 9 INJECTION, SOLUTION INTRAVENOUS at 18:14

## 2024-09-05 RX ADMIN — SODIUM CHLORIDE, PRESERVATIVE FREE 10 ML: 5 INJECTION INTRAVENOUS at 20:26

## 2024-09-05 RX ADMIN — NIFEDIPINE 30 MG: 30 TABLET, FILM COATED, EXTENDED RELEASE ORAL at 16:36

## 2024-09-05 ASSESSMENT — PAIN SCALES - GENERAL: PAINLEVEL_OUTOF10: 2

## 2024-09-05 NOTE — PROGRESS NOTES
Hospital Medicine Progress Note      Date of Admission: 9/3/2024  Hospital Day: 3    Chief Admission Complaint:  Worsening shortness of breath and lower extremity edema      Subjective:  no sob or CP, has non pitting leg swelling    H/o heavy PV bleeding monthly   No cough or change in MS   , no N or V or D    Presenting Admission History:       This is a 44-year-old female who presented to the emergency room with a chief complaint of having lower extremity edema and worsening of shortness of breath.     Patient denies any chest pain no fever no chills no recent upper respiratory infection.       Assessment/Plan:      Current Principal Problem:  Edema, lower extremity    Extreme tiredness shortness of breath most probably secondary to microcytic anemia-/secondary to iron deficiency following heavy menstrual bleeding  Patient denies OR bleed, stool guaiac pending  -Iron supplements,started on IV Fe   advised GYN follow-up as an outpatient  Given history of hypothyroidism and thyroidectomy check TSH level- WNL  Cardio input appreciated , ruled out  cardiac causes and signed off    HTN - BP not controlled   ARB/ HCTZ/ Norvasc  were  discontinued   Started on coreg and Clonidine     Nonpitting edema possibly secondary to Norvasc-  She was started on losartan and hydrochlorothiazide for high blood pressure at [facility] month ago and Norvasc was added week ago  Currently holding   Recent echocardiogram with ejection fraction within normal limit no diastolic dysfunction  Chest x-ray within normal limits no evidence of fluid overload   Congestive heart failure unlikely  Venous doppler pending    RUI-hold nephrotoxic medications and monitor BMP encourage p.o. fluid, Lasix was started upon admission -discontinued  Patient does not have CHF  Avoid nephrotoxic  meds   Nephrology consulted     History of left Bell's palsy    Morbid obesity-counseled    Anxiety    Physical Exam Performed:      General appearance:  No  abnormalities requiring IV replacement and close serial monitoring  [] Insulin - monitoring serial FSBS for Hypoglycemic adverse drug reaction  [] Anticoagulation requiring serial monitoring of coagulation factors  [] Other -   [] Change in code status:    [] Decision to escalate care:    [] Major surgery/procedure with associated risk factors:    ----------------------------------------------------------------------  C. Data (any 2)  [x] Discussed current management and discharge planning options with Case Management.  [x] Discussed management of the case with:  nephrology   [] Telemetry personally reviewed and interpreted as documented above    [] Imaging personally reviewed and interpreted, includes:    [x] Data Review (any 3)  [x] All available Consultant notes from yesterday/today were reviewed  [x] All current labs were reviewed and interpreted for clinical significance   [x] Appropriate follow-up labs were ordered  [] Collateral history obtained from:        Medications:  Personally reviewed in detail in conjunction w/ labs as documented for evidence of drug toxicity.     Infusion Medications    sodium chloride       Scheduled Medications    iron sucrose (VENOFER) 200 mg in sodium chloride 0.9 % 100 mL IVPB  200 mg IntraVENous Q24H    NIFEdipine  30 mg Oral Daily    cloNIDine  0.1 mg Oral BID    [Held by provider] ferrous sulfate  325 mg Oral BID WC    carvedilol  3.125 mg Oral BID WC    levothyroxine  175 mcg Oral Daily    sodium chloride flush  5-40 mL IntraVENous 2 times per day    enoxaparin  30 mg SubCUTAneous BID     PRN Meds: sodium chloride flush, sodium chloride, ondansetron **OR** ondansetron, polyethylene glycol, acetaminophen **OR** acetaminophen, potassium chloride **OR** potassium alternative oral replacement **OR** potassium chloride, magnesium sulfate, nitroGLYCERIN     Labs:  Personally reviewed and interpreted for clinical significance.     Recent Labs     09/03/24 2057 09/04/24  0544   WBC

## 2024-09-05 NOTE — CARE COORDINATION
LOS 2.  Care managed by Hosp Med, Card, Neph. Here w LE Edema, RUI. From home w family. Likely NN.  Thelma Carroll RN

## 2024-09-05 NOTE — FLOWSHEET NOTE
09/05/24 1953   Assessment   Charting Type Shift assessment   Psychosocial   Psychosocial (WDL) WDL   Neurological   Neuro (WDL) WDL   Urszula Coma Scale   Eye Opening 4   Best Verbal Response 5   Best Motor Response 6   Marana Coma Scale Score 15   HEENT (Head, Ears, Eyes, Nose, & Throat)   HEENT (WDL) X   Right Eye Glasses;Impaired vision   Left Eye Glasses;Impaired vision   Respiratory   Respiratory (WDL) X   Respiratory Pattern Regular   Respiratory Depth Normal   Respiratory Quality/Effort Dyspnea with exertion   Chest Assessment Chest expansion symmetrical;Trachea midline   L Breath Sounds Diminished;Clear   R Breath Sounds Diminished;Clear   Level of Activity/Mobility 0   Cardiac   Cardiac (WDL) WDL   Gastrointestinal   Abdominal (WDL) WDL   Genitourinary   Genitourinary (WDL) WDL   Peripheral Vascular   Peripheral Vascular (WDL) X   Edema Left lower extremity;Right lower extremity   RLE Edema +3;Pitting   LLE Edema +3;Pitting   Skin Integumentary    Skin Integumentary (WDL) WDL   Musculoskeletal   Musculoskeletal (WDL) X   RL Extremity Swelling   LL Extremity Swelling

## 2024-09-05 NOTE — CONSULTS
Ph: [phone], Fax: [phone]           [facility]               Reason for admission:                 Shortness of breath    Brief Summary :     Court Rascon is being seen by nephrology for RUI.  She is found to have shortness of breath, workup revealing severe iron deficiency anemia  In the meantime she also had edema and got IV Lasix she has RUI because of which we are consulted.      Interval History and plan:     Seen by cardiology  No evidence of CHF  Diuretics on hold  Started on Coreg twice daily yesterday    Plan:  Start on Venofer for very low iron  Listed to be on meloxicam outpatient - stopped  Start on low-dose of nifedipine  Once her renal function is stable she should be able to go back to lisinopril-hydrochlorothiazide                     Assessment :     Acute Kidney Injury  Creatinine 1.3 at the time of consult  1 on 8/15/2024  Thought due to be due to hemodynamic changes/diuresis  Diuretics were held at the time of consult  She was on both Lasix and lisinopril-hydrochlorothiazide as an outpatient      Hypertension   BP: (145-164)/(81-94)  Pulse:  [68-79]   BP goal inpatient 130-140 systolic inpatient  Uncontrolled trying to lose weight which will help blood pressure also    Anemia  Unlikely to have anemia of CKD   platelet is normal  Iron saturation is 5%      [facility] would like to thank Nakul Santana MD   for opportunity to serve this patient      Please call with questions at-   24 Hrs Answering service [phone] or  7 am- 5 pm via Perfect serve or cell phone  Dr.Sudhir Katarina MD       HPI :     Court Rascon is a 44 y.o. female presented to   the hospital on 9/3/2024 with chief complaint of shortness of breath, associated with lower extremity edema.  Also hypertension.  She is known to have hypertension which is not very well-controlled.  She was treated with diuretics.  Now her creatinine has gone up and diuretics has been held  Seen

## 2024-09-06 ENCOUNTER — APPOINTMENT (OUTPATIENT)
Dept: VASCULAR LAB | Age: 44
DRG: 812 | End: 2024-09-06
Attending: INTERNAL MEDICINE
Payer: COMMERCIAL

## 2024-09-06 VITALS
OXYGEN SATURATION: 98 % | WEIGHT: 293 LBS | HEIGHT: 67 IN | RESPIRATION RATE: 16 BRPM | BODY MASS INDEX: 45.99 KG/M2 | TEMPERATURE: 98 F | HEART RATE: 70 BPM | SYSTOLIC BLOOD PRESSURE: 150 MMHG | DIASTOLIC BLOOD PRESSURE: 91 MMHG

## 2024-09-06 LAB
ANION GAP SERPL CALCULATED.3IONS-SCNC: 10 MMOL/L (ref 3–16)
BASOPHILS # BLD: 0 K/UL (ref 0–0.2)
BASOPHILS NFR BLD: 0.5 %
BUN SERPL-MCNC: 42 MG/DL (ref 7–20)
CALCIUM SERPL-MCNC: 7.3 MG/DL (ref 8.3–10.6)
CHLORIDE SERPL-SCNC: 101 MMOL/L (ref 99–110)
CO2 SERPL-SCNC: 23 MMOL/L (ref 21–32)
CREAT SERPL-MCNC: 1 MG/DL (ref 0.6–1.1)
DEPRECATED RDW RBC AUTO: 16.8 % (ref 12.4–15.4)
EOSINOPHIL # BLD: 0.4 K/UL (ref 0–0.6)
EOSINOPHIL NFR BLD: 4.2 %
GFR SERPLBLD CREATININE-BSD FMLA CKD-EPI: 70 ML/MIN/{1.73_M2}
GLUCOSE SERPL-MCNC: 109 MG/DL (ref 70–99)
HCT VFR BLD AUTO: 26.7 % (ref 36–48)
HGB BLD-MCNC: 8.5 G/DL (ref 12–16)
LYMPHOCYTES # BLD: 1.3 K/UL (ref 1–5.1)
LYMPHOCYTES NFR BLD: 15.8 %
MAGNESIUM SERPL-MCNC: 2.7 MG/DL (ref 1.8–2.4)
MCH RBC QN AUTO: 22.5 PG (ref 26–34)
MCHC RBC AUTO-ENTMCNC: 31.8 G/DL (ref 31–36)
MCV RBC AUTO: 70.8 FL (ref 80–100)
MONOCYTES # BLD: 0.8 K/UL (ref 0–1.3)
MONOCYTES NFR BLD: 10.1 %
NEUTROPHILS # BLD: 5.9 K/UL (ref 1.7–7.7)
NEUTROPHILS NFR BLD: 69.4 %
NT-PROBNP SERPL-MCNC: 553 PG/ML (ref 0–124)
PLATELET # BLD AUTO: 431 K/UL (ref 135–450)
PMV BLD AUTO: 7.5 FL (ref 5–10.5)
POTASSIUM SERPL-SCNC: 4.2 MMOL/L (ref 3.5–5.1)
RBC # BLD AUTO: 3.78 M/UL (ref 4–5.2)
SODIUM SERPL-SCNC: 134 MMOL/L (ref 136–145)
WBC # BLD AUTO: 8.4 K/UL (ref 4–11)

## 2024-09-06 PROCEDURE — 36415 COLL VENOUS BLD VENIPUNCTURE: CPT

## 2024-09-06 PROCEDURE — 85025 COMPLETE CBC W/AUTO DIFF WBC: CPT

## 2024-09-06 PROCEDURE — G0378 HOSPITAL OBSERVATION PER HR: HCPCS

## 2024-09-06 PROCEDURE — 6360000002 HC RX W HCPCS: Performed by: INTERNAL MEDICINE

## 2024-09-06 PROCEDURE — 6370000000 HC RX 637 (ALT 250 FOR IP): Performed by: INTERNAL MEDICINE

## 2024-09-06 PROCEDURE — 96372 THER/PROPH/DIAG INJ SC/IM: CPT

## 2024-09-06 PROCEDURE — 80048 BASIC METABOLIC PNL TOTAL CA: CPT

## 2024-09-06 PROCEDURE — 83880 ASSAY OF NATRIURETIC PEPTIDE: CPT

## 2024-09-06 PROCEDURE — 83735 ASSAY OF MAGNESIUM: CPT

## 2024-09-06 PROCEDURE — 93970 EXTREMITY STUDY: CPT | Performed by: SURGERY

## 2024-09-06 PROCEDURE — 93970 EXTREMITY STUDY: CPT

## 2024-09-06 PROCEDURE — 6370000000 HC RX 637 (ALT 250 FOR IP): Performed by: STUDENT IN AN ORGANIZED HEALTH CARE EDUCATION/TRAINING PROGRAM

## 2024-09-06 RX ORDER — CARVEDILOL 3.12 MG/1
3.12 TABLET ORAL 2 TIMES DAILY WITH MEALS
Qty: 60 TABLET | Refills: 0 | Status: SHIPPED | OUTPATIENT
Start: 2024-09-06

## 2024-09-06 RX ORDER — LABETALOL HYDROCHLORIDE 5 MG/ML
10 INJECTION, SOLUTION INTRAVENOUS EVERY 4 HOURS PRN
Status: DISCONTINUED | OUTPATIENT
Start: 2024-09-06 | End: 2024-09-06 | Stop reason: HOSPADM

## 2024-09-06 RX ORDER — CLONIDINE HYDROCHLORIDE 0.1 MG/1
0.1 TABLET ORAL 2 TIMES DAILY
Qty: 60 TABLET | Refills: 0 | Status: SHIPPED | OUTPATIENT
Start: 2024-09-06

## 2024-09-06 RX ORDER — FERROUS SULFATE 325(65) MG
325 TABLET ORAL 2 TIMES DAILY WITH MEALS
Qty: 60 TABLET | Refills: 0 | Status: SHIPPED | OUTPATIENT
Start: 2024-09-06

## 2024-09-06 RX ORDER — NIFEDIPINE 30 MG/1
30 TABLET, EXTENDED RELEASE ORAL 2 TIMES DAILY
Qty: 60 TABLET | Refills: 0 | Status: SHIPPED | OUTPATIENT
Start: 2024-09-06

## 2024-09-06 RX ORDER — NIFEDIPINE 30 MG/1
30 TABLET, EXTENDED RELEASE ORAL 2 TIMES DAILY
Status: DISCONTINUED | OUTPATIENT
Start: 2024-09-06 | End: 2024-09-06 | Stop reason: HOSPADM

## 2024-09-06 RX ADMIN — ENOXAPARIN SODIUM 30 MG: 100 INJECTION SUBCUTANEOUS at 08:42

## 2024-09-06 RX ADMIN — CARVEDILOL 3.12 MG: 3.12 TABLET, FILM COATED ORAL at 08:42

## 2024-09-06 RX ADMIN — CLONIDINE HYDROCHLORIDE 0.1 MG: 0.1 TABLET ORAL at 08:42

## 2024-09-06 RX ADMIN — LEVOTHYROXINE SODIUM 175 MCG: 0.05 TABLET ORAL at 05:11

## 2024-09-06 RX ADMIN — NIFEDIPINE 30 MG: 30 TABLET, FILM COATED, EXTENDED RELEASE ORAL at 08:42

## 2024-09-06 ASSESSMENT — PAIN SCALES - GENERAL: PAINLEVEL_OUTOF10: 0

## 2024-09-06 NOTE — PROGRESS NOTES
Discharge instructions provided to pt. Pt understood follow up appointments and new medications. Pt also removed from Telemetry and PIV removed. Pt currently awaiting ride from friend/family member.

## 2024-09-06 NOTE — CARE COORDINATION
Chart review day 3- from home, IPTA, edema lower ext. RUI after over diuresis, Diuretics held. HTN adjusting meds. Venous doppler pending. Anticipate no needs for dc to home. Cm will follow for DCP needs.

## 2024-09-06 NOTE — CARE COORDINATION
CASE MANAGEMENT DISCHARGE SUMMARY      Discharge to: home, no needs         IMM given: (date) NA         Transportation:    Family/car:        Confirmed discharge plan with:     Patient: yes      Family:   no- pt notified        RN, name: Rachana            details…

## 2024-09-06 NOTE — DISCHARGE SUMMARY
Hospital Medicine Discharge Summary    Patient: Court Rascon   : 1980     Admit Date: 9/3/2024   Discharge Date:    2024   Disposition:  [x]Home   []HHC  []SNF  []ECF  []Acute Rehab  []LTAC  []Hospice  Code status:  [x]Full  []DNR/CCA  []Limited (DNR/CCA with Do Not Intubate)  []DNRCC  Condition at Discharge: Stable  Primary Care Provider: Christine Wesley APRN - CNP    Admitting Provider: Alba Silver MD  Discharge Provider: Nakul Santana MD     Discharge Diagnoses:      Active Hospital Problems    Diagnosis     Edema, lower extremity [R60.0]        Presenting Admission History:      This is a 44-year-old female who presented to the emergency room with a chief complaint of having lower extremity edema and worsening of shortness of breath.     Patient denies any chest pain no fever no chills no recent upper respiratory infection.     Assessment/Plan:      Extreme tiredness shortness of breath most probably secondary to microcytic anemia-/secondary to iron deficiency following heavy menstrual bleeding  Patient denies [place] bleed, stool guaiac pending  -Iron supplements,started on IV Fe x 2 - cont wit po   advised GYN follow-up as an outpatient  Given history of hypothyroidism and thyroidectomy check TSH level- WNL  Cardio input appreciated , ruled out  cardiac causes and signed off  PCP f/u for further w/u for anemia if needed      HTN - BP not controlled   ARB/ HCTZ/ Norvasc  were  discontinued   Started on coreg and Clonidine and procardia   Controlled      Nonpitting edema possibly secondary to Norvasc-  She was started on losartan and hydrochlorothiazide for high blood pressure at [facility] month ago and Norvasc was added week ago  Currently holding   Recent echocardiogram with ejection fraction within normal limit no diastolic dysfunction  Chest x-ray within normal limits no evidence of fluid overload   Congestive heart failure unlikely  Venous doppler - rued out DVT ,

## 2024-09-06 NOTE — PROGRESS NOTES
Ph: [phone], Fax: [phone]           [facility]               Reason for admission:                 Shortness of breath    Brief Summary :     Court Rascon is being seen by nephrology for RUI.  She is found to have shortness of breath, workup revealing severe iron deficiency anemia  In the meantime she also had edema and got IV Lasix she has RUI because of which we are consulted.      Interval History and plan:   Patient seen at bedside  Comfortable  BP elevated side 155/73  On room air  Urine output 6 x   Cr 1.3 > 1.0  Na 135 > 134          On Venofer for very low iron here. Upon discharge start oral ferrous sulphate.   Avoid NSAIDs  On Coreg 3.125 mg Coreg, Clonidine 0.1 mg BID and Nifedipine 30 mg daily for HTN. Increase Nifedipine to 30 mg BID and follow BP.   Plan for D/C today  Provided office contact number to call for follow up appointment or in case of any questions    Pt agree  D/W primary team                     Assessment :     Acute Kidney Injury  Creatinine 1.3 at the time of consult  1 on 8/15/2024  Thought due to be due to hemodynamic changes/diuresis  Diuretics were held at the time of consult  She was on both Lasix and lisinopril-hydrochlorothiazide as an outpatient      Hypertension   BP: (155-175)/()  Pulse:  [67-79]   BP goal inpatient 130-140 systolic inpatient  Uncontrolled trying to lose weight which will help blood pressure also    Anemia  Unlikely to have anemia of CKD   platelet is normal  Iron saturation is 5%      [facility] would like to thank Nakul Santana MD   for opportunity to serve this patient      Please call with questions at-   24 Hrs Answering service [phone] or  7 am- 5 pm via Perfect serve or cell phone  Dr.Muhammad PAUL Muir MD       HPI :     Court Rascon is a 44 y.o. female presented to   the hospital on 9/3/2024 with chief complaint of shortness of breath, associated with lower extremity edema.  Also  IntraVENous, PRN  nitroGLYCERIN (NITROSTAT) SL tablet 0.4 mg, 0.4 mg, SubLINGual, Q5 Min PRN    Vitals :     Vitals:    09/06/24 0840   BP: (!) 155/73   Pulse: 79   Resp:    Temp: 98 °F (36.7 °C)   SpO2: 98%          Physical Examination :     appearance: Alert, oriented x 3   Respiratory:  No RD on room air. Lungs clear.   Cardiovascular:  Edema trace  Abdomen:  soft  Other relevant findings:        Labs :     CBC:   Recent Labs     09/03/24 2057 09/04/24 0544 09/06/24 0533   WBC 9.9 8.8 8.4   HGB 9.3* 8.9* 8.5*   HCT 29.0* 27.7* 26.7*    373 431     BMP:    Recent Labs     09/04/24 0544 09/05/24 0633 09/06/24 0533    135* 134*   K 3.9 4.2 4.2    103 101   CO2 22 23 23   BUN 50* 50* 42*   CREATININE 1.2* 1.3* 1.0   GLUCOSE 117* 99 109*   MG 2.50* 2.60* 2.70*     Lab Results   Component Value Date/Time    COLORU Yellow 08/15/2024 08:54 AM    NITRU Negative 08/15/2024 08:54 AM    GLUCOSEU Negative 08/15/2024 08:54 AM    KETUA Negative 08/15/2024 08:54 AM    UROBILINOGEN 0.2 08/15/2024 08:54 AM    BILIRUBINUR Negative 08/15/2024 08:54 AM        ----------------------------------------------------------  Please call with questions at      24 Hrs Answering service [phone]  Perfect serve, or cell phone 7 am - 5pm  Sriram Bray MD   [facility]

## 2024-09-06 NOTE — PROGRESS NOTES
Paged Chava Pittman, HARRISON    patient admitted for RUI,  Patient is /102. due to kidney function they are holding her Losartan and holding Norvasc due to possible issues with edema in her BLE.  Do you want to put a PRN in for HTN?  Sharla    New orders given.

## 2024-09-09 ENCOUNTER — FOLLOWUP TELEPHONE ENCOUNTER (OUTPATIENT)
Dept: TELEMETRY | Age: 44
End: 2024-09-09

## 2024-09-18 ENCOUNTER — TELEPHONE (OUTPATIENT)
Dept: CARDIOLOGY CLINIC | Age: 44
End: 2024-09-18

## 2024-10-14 ENCOUNTER — TELEPHONE (OUTPATIENT)
Dept: CARDIOLOGY CLINIC | Age: 44
End: 2024-10-14

## 2024-10-14 DIAGNOSIS — R06.83 SNORING: Primary | ICD-10-CM

## 2024-10-14 NOTE — TELEPHONE ENCOUNTER
Dr Terrell's office received referral for pt to be seen. Crystal in office needs new referral, with corrected DX code. Crystal can be reached at [phone].Please advise

## 2024-10-16 ENCOUNTER — OFFICE VISIT (OUTPATIENT)
Dept: PULMONOLOGY | Age: 44
End: 2024-10-16

## 2024-10-16 VITALS
DIASTOLIC BLOOD PRESSURE: 82 MMHG | HEART RATE: 70 BPM | HEIGHT: 67 IN | SYSTOLIC BLOOD PRESSURE: 124 MMHG | OXYGEN SATURATION: 99 % | WEIGHT: 293 LBS | BODY MASS INDEX: 45.99 KG/M2

## 2024-10-16 DIAGNOSIS — E66.01 MORBID OBESITY: ICD-10-CM

## 2024-10-16 DIAGNOSIS — I10 ESSENTIAL HYPERTENSION: ICD-10-CM

## 2024-10-16 DIAGNOSIS — G47.10 HYPERSOMNIA: Primary | ICD-10-CM

## 2024-10-16 DIAGNOSIS — R06.83 SNORING: ICD-10-CM

## 2024-10-16 ASSESSMENT — SLEEP AND FATIGUE QUESTIONNAIRES
HOW LIKELY ARE YOU TO NOD OFF OR FALL ASLEEP WHEN YOU ARE A PASSENGER IN A CAR FOR AN HOUR WITHOUT A BREAK: HIGH CHANCE OF DOZING
ESS TOTAL SCORE: 17
HOW LIKELY ARE YOU TO NOD OFF OR FALL ASLEEP WHILE SITTING INACTIVE IN A PUBLIC PLACE: MODERATE CHANCE OF DOZING
HOW LIKELY ARE YOU TO NOD OFF OR FALL ASLEEP IN A CAR, WHILE STOPPED FOR A FEW MINUTES IN TRAFFIC: SLIGHT CHANCE OF DOZING
HOW LIKELY ARE YOU TO NOD OFF OR FALL ASLEEP WHILE SITTING AND READING: HIGH CHANCE OF DOZING
HOW LIKELY ARE YOU TO NOD OFF OR FALL ASLEEP WHILE WATCHING TV: HIGH CHANCE OF DOZING
HOW LIKELY ARE YOU TO NOD OFF OR FALL ASLEEP WHILE LYING DOWN TO REST IN THE AFTERNOON WHEN CIRCUMSTANCES PERMIT: HIGH CHANCE OF DOZING
HOW LIKELY ARE YOU TO NOD OFF OR FALL ASLEEP WHILE SITTING AND TALKING TO SOMEONE: WOULD NEVER DOZE
HOW LIKELY ARE YOU TO NOD OFF OR FALL ASLEEP WHILE SITTING QUIETLY AFTER LUNCH WITHOUT ALCOHOL: MODERATE CHANCE OF DOZING

## 2024-10-16 NOTE — PATIENT INSTRUCTIONS
[facility]                   [facility]                     [address], [address], [place] [address]                  [facility]                   [address], [address], [place] [address]                      Phone: [phone] Fax: [phone]                Dear Sleep Center Patient,     For in-lab testing please, bring with you:  Appropriate nightclothes (pajamas, sweats, etc.), slippers and robe  All medications you will need during you stay, including any breathing medications, nebulizers and metered dose inhalers  Your own toiletries and hairdryer if you wish to shower before you leave  Current photo I.D. and Insurance card  We do not allow any pillows or bed linens from home due to health regulations  -We recommend that you not bring valuables with you to the Sleep Center, as we cannot be responsible for any lost or damaged personal items.  Please be aware that [facility] is a non-smoking facility. There is no smoking allowed anywhere on [facility] property at any time.  Each patient room is a private room with a private bathroom.  The in-lab test itself consist of electrodes and sensors attached to specific areas of your scalp, face, chest and legs. We will also monitor respiratory effort, nasal and oral airflow and oxygen levels. The test will not hurt- it is painless and not invasive in any way.      At home testing when you come to  the rental unit, please bring:   -I.D. and Insurance card  **Please note the Home Sleep Test Units are limited. It is a 1 night rental and must be dropped off the following day to ensure you are not billed a late or replacement fee**    Please refrain from or reduce the use of caffeine and/or alcohol prior to your sleep study and avoid napping the day of your study, as these will affect the accuracy of your test results.  If you are ill the day of your test

## 2024-10-16 NOTE — PROGRESS NOTES
differently: Take 1 tablet by mouth daily (with breakfast)), Disp: 60 tablet, Rfl: 0    topiramate (TOPAMAX) 25 MG tablet, Take 2 tablets by mouth 2 times daily, Disp: , Rfl:     Cholecalciferol (VITAMIN D) 50 MCG (2000 UT) CAPS capsule, Take by mouth, Disp: , Rfl:     levothyroxine (SYNTHROID) 175 MCG tablet, Take 1 tablet by mouth Daily, Disp: , Rfl:       REVIEW OF SYSTEMS:  Constitutional: Negative for fever  HENT: Negative for sore throat  Eyes: Negative for redness   Respiratory: Negative for dyspnea, cough  Cardiovascular: Negative for chest pain  Gastrointestinal: Negative for vomiting, diarrhea   Genitourinary: Negative for hematuria   Musculoskeletal: Negative for arthralgias   Skin: Negative for rash  Neurological: Negative for syncope  Hematological: Negative for adenopathy  Psychiatric/Behavorial: Negative for anxiety      Objective:   PHYSICAL EXAM:    Blood pressure 124/82, pulse 70, height 1.702 m (5' 7\"), weight 134.7 kg (297 lb), SpO2 99%, not currently breastfeeding.' on RA  Gen: No distress. Obese. BMI of 46.52  Eyes: PERRL. No sclera icterus. No conjunctival injection.   ENT: No discharge. Pharynx clear. Mallampati class IV.   Neck: Trachea midline. No obvious mass. Neck circumference 16\"  Resp: No accessory muscle use. No crackles. No wheezes. No rhonchi. No dullness on percussion. Good air entry.   CV: Regular rate. Regular rhythm. No murmur or rub. No edema.   GI: Non-tender. Non-distended. No hernia.   Skin: Warm and dry. No nodule on exposed extremities.   Lymph: No cervical LAD. No supraclavicular LAD.   M/S: No cyanosis. No joint deformity. No clubbing.   Neuro: Awake. Alert. Moves all four extremities.   Psych: Oriented x 3. No anxiety.         DATA reviewed by me:   TSH 3.72  Hemoglobin 8.5  Creatinine 1.3    Assessment:       Hypersomnia and fatigue. DDx RADHA, narcolepsy, idiopathic hypersomnia, parasomnia.  Overall history is suggesting RADHA.   Severe snoring   Obesity class

## 2024-10-18 ENCOUNTER — TELEPHONE (OUTPATIENT)
Dept: SLEEP CENTER | Age: 44
End: 2024-10-18

## 2024-10-25 NOTE — PROGRESS NOTES
100 09/03/2024 08:57 PM    AST 17 09/03/2024 08:57 PM    ALT 17 09/03/2024 08:57 PM     Lab Results   Component Value Date    CHOL 160 09/04/2024    TRIG 132 09/04/2024    HDL 25 (L) 09/04/2024     (H) 09/04/2024    VLDL 26 09/04/2024     Lab Results   Component Value Date    TSH 3.72 09/04/2024     Cardiac Data:     EKG 9/4/24: Normal sinus rhythm    Echo 8/21/24  Summary:   Left ventricular wall motion is normal.   Overall left ventricular ejection fraction is estimated to be 55-60%.   Left ventricular function is normal.   Mild mitral regurgitation.    Stress Test:    Cardiac catheterization:    Additional studies:     Impression and Plan:     Dyspnea on exertion, resolved  Bilateral lower extremity edema; most consistent with Phlebolymphedema -improved  Venous HTN; likely central 2/2 Obesity   Essential HTN, improved control  Iron Def. Anemia, improved  Hyperlipidemia  Menorrhagia, improving now on birth control  Thyroid disease s/p thyroidectomy                 -TSH WNL  RADHA suspected, upcoming sleep study  Morbid obesity    PLAN  Patient is doing well.  No chest pain.  Dyspnea on exertion has resolved.  Again suspect this was all secondary to symptomatic iron deficiency anemia.  Patient received iron infusion in the hospital and is on p.o. iron now.  Managed by PCP.  No overt signs of hypervolemia.  Has some lower extremity edema most consistent with febrile lymphedema.  Recommend obtaining knee high compression stockings from [facility], [address] [place] [address]. We recommend 20-30 mmHg. Wear these during the day and take off at night. Would also recommend elevating feet when sitting to help mobilize fluid.     Continue taking coreg 3.125mg twice a day, nifedipine, clonidine.  Recommend Mediterranean diet for lipid management.    Follow up 6 months      Scribe's attestation:  This note was scribed in the presence of Dr. Dwayne Osorio DO. By Maureen Su RN      I, Dr. Rice

## 2024-10-31 ENCOUNTER — OFFICE VISIT (OUTPATIENT)
Dept: CARDIOLOGY CLINIC | Age: 44
End: 2024-10-31
Payer: COMMERCIAL

## 2024-10-31 VITALS
OXYGEN SATURATION: 98 % | BODY MASS INDEX: 46.51 KG/M2 | HEIGHT: 67 IN | SYSTOLIC BLOOD PRESSURE: 132 MMHG | DIASTOLIC BLOOD PRESSURE: 84 MMHG | HEART RATE: 82 BPM

## 2024-10-31 DIAGNOSIS — I10 ESSENTIAL HYPERTENSION: Primary | ICD-10-CM

## 2024-10-31 DIAGNOSIS — Z87.42 H/O MENORRHAGIA: ICD-10-CM

## 2024-10-31 DIAGNOSIS — G51.0 BELL'S PALSY: ICD-10-CM

## 2024-10-31 DIAGNOSIS — D50.8 OTHER IRON DEFICIENCY ANEMIA: ICD-10-CM

## 2024-10-31 DIAGNOSIS — E78.2 MIXED HYPERLIPIDEMIA: ICD-10-CM

## 2024-10-31 DIAGNOSIS — E07.9 THYROID DISEASE: ICD-10-CM

## 2024-10-31 PROBLEM — D50.9 IRON DEFICIENCY ANEMIA: Status: ACTIVE | Noted: 2024-10-31

## 2024-10-31 PROCEDURE — G8427 DOCREV CUR MEDS BY ELIG CLIN: HCPCS | Performed by: STUDENT IN AN ORGANIZED HEALTH CARE EDUCATION/TRAINING PROGRAM

## 2024-10-31 PROCEDURE — 3079F DIAST BP 80-89 MM HG: CPT | Performed by: STUDENT IN AN ORGANIZED HEALTH CARE EDUCATION/TRAINING PROGRAM

## 2024-10-31 PROCEDURE — G8484 FLU IMMUNIZE NO ADMIN: HCPCS | Performed by: STUDENT IN AN ORGANIZED HEALTH CARE EDUCATION/TRAINING PROGRAM

## 2024-10-31 PROCEDURE — G8417 CALC BMI ABV UP PARAM F/U: HCPCS | Performed by: STUDENT IN AN ORGANIZED HEALTH CARE EDUCATION/TRAINING PROGRAM

## 2024-10-31 PROCEDURE — 99204 OFFICE O/P NEW MOD 45 MIN: CPT | Performed by: STUDENT IN AN ORGANIZED HEALTH CARE EDUCATION/TRAINING PROGRAM

## 2024-10-31 PROCEDURE — 1036F TOBACCO NON-USER: CPT | Performed by: STUDENT IN AN ORGANIZED HEALTH CARE EDUCATION/TRAINING PROGRAM

## 2024-10-31 PROCEDURE — 3075F SYST BP GE 130 - 139MM HG: CPT | Performed by: STUDENT IN AN ORGANIZED HEALTH CARE EDUCATION/TRAINING PROGRAM

## 2024-10-31 RX ORDER — DROSPIRENONE 4 MG/1
TABLET, FILM COATED ORAL
COMMUNITY

## 2024-10-31 NOTE — PATIENT INSTRUCTIONS
Recommend obtaining knee high compression stockings from [facility], [address] [place] [address]. We recommend 20-30 mmHg. Wear these during the day and take off at night. Would also recommend elevating feet when sitting to help mobilize fluid.     Continue taking coreg 3.125mg twice a day

## 2024-11-14 ENCOUNTER — HOSPITAL ENCOUNTER (OUTPATIENT)
Dept: SLEEP CENTER | Age: 44
Discharge: HOME | End: 2024-11-16
Payer: COMMERCIAL

## 2024-11-14 DIAGNOSIS — R06.83 SNORING: ICD-10-CM

## 2024-11-14 DIAGNOSIS — E66.01 MORBID OBESITY: ICD-10-CM

## 2024-11-14 DIAGNOSIS — I10 ESSENTIAL HYPERTENSION: ICD-10-CM

## 2024-11-14 DIAGNOSIS — G47.10 HYPERSOMNIA: ICD-10-CM

## 2024-11-14 PROCEDURE — 95806 SLEEP STUDY UNATT&RESP EFFT: CPT

## 2024-11-18 PROBLEM — R06.83 SNORING: Status: ACTIVE | Noted: 2024-11-18

## 2024-11-18 PROBLEM — G47.10 HYPERSOMNIA: Status: ACTIVE | Noted: 2024-11-18

## 2024-11-18 PROBLEM — E66.01 MORBID OBESITY: Status: ACTIVE | Noted: 2024-11-18

## 2024-11-19 ENCOUNTER — TELEPHONE (OUTPATIENT)
Dept: PULMONOLOGY | Age: 44
End: 2024-11-19

## 2024-11-19 DIAGNOSIS — G47.33 OSA (OBSTRUCTIVE SLEEP APNEA): Primary | ICD-10-CM

## 2024-12-08 ENCOUNTER — HOSPITAL ENCOUNTER (OUTPATIENT)
Dept: SLEEP CENTER | Age: 44
Discharge: HOME | End: 2024-12-10
Payer: COMMERCIAL

## 2024-12-08 DIAGNOSIS — G47.33 OSA (OBSTRUCTIVE SLEEP APNEA): ICD-10-CM

## 2024-12-08 PROCEDURE — 95811 POLYSOM 6/>YRS CPAP 4/> PARM: CPT

## 2024-12-09 PROBLEM — G47.33 OSA (OBSTRUCTIVE SLEEP APNEA): Status: ACTIVE | Noted: 2024-12-09

## 2024-12-09 PROCEDURE — 95811 POLYSOM 6/>YRS CPAP 4/> PARM: CPT | Performed by: INTERNAL MEDICINE

## 2024-12-31 ENCOUNTER — TELEPHONE (OUTPATIENT)
Dept: PULMONOLOGY | Age: 44
End: 2024-12-31

## 2024-12-31 DIAGNOSIS — G47.33 OSA (OBSTRUCTIVE SLEEP APNEA): Primary | ICD-10-CM

## 2025-01-14 ENCOUNTER — TELEPHONE (OUTPATIENT)
Dept: PULMONOLOGY | Age: 45
End: 2025-01-14

## 2025-01-14 NOTE — TELEPHONE ENCOUNTER
[facility] sent a fax stating that patient called and told them that will schedule appointment to get set up with Cpap in mid February.

## 2025-02-13 ENCOUNTER — TELEPHONE (OUTPATIENT)
Dept: PULMONOLOGY | Age: 45
End: 2025-02-13

## 2025-02-13 NOTE — TELEPHONE ENCOUNTER
[facility] faxed over a confirmation of set up on a CPAP on 2-10-25.  They are needing a 31-90 day follow up to be scheduled between 3/13/25 and  5/11/25.      I have called and left a VM for pt to call and set up appt..

## 2025-04-15 ENCOUNTER — OFFICE VISIT (OUTPATIENT)
Dept: PULMONOLOGY | Age: 45
End: 2025-04-15
Payer: COMMERCIAL

## 2025-04-15 VITALS
OXYGEN SATURATION: 96 % | RESPIRATION RATE: 16 BRPM | HEART RATE: 84 BPM | WEIGHT: 293 LBS | DIASTOLIC BLOOD PRESSURE: 84 MMHG | BODY MASS INDEX: 45.99 KG/M2 | SYSTOLIC BLOOD PRESSURE: 126 MMHG | HEIGHT: 67 IN

## 2025-04-15 DIAGNOSIS — G47.33 OSA (OBSTRUCTIVE SLEEP APNEA): Primary | ICD-10-CM

## 2025-04-15 DIAGNOSIS — I10 ESSENTIAL HYPERTENSION: ICD-10-CM

## 2025-04-15 PROCEDURE — 99214 OFFICE O/P EST MOD 30 MIN: CPT | Performed by: INTERNAL MEDICINE

## 2025-04-15 PROCEDURE — 3074F SYST BP LT 130 MM HG: CPT | Performed by: INTERNAL MEDICINE

## 2025-04-15 PROCEDURE — 1036F TOBACCO NON-USER: CPT | Performed by: INTERNAL MEDICINE

## 2025-04-15 PROCEDURE — 3079F DIAST BP 80-89 MM HG: CPT | Performed by: INTERNAL MEDICINE

## 2025-04-15 PROCEDURE — G8417 CALC BMI ABV UP PARAM F/U: HCPCS | Performed by: INTERNAL MEDICINE

## 2025-04-15 PROCEDURE — G8427 DOCREV CUR MEDS BY ELIG CLIN: HCPCS | Performed by: INTERNAL MEDICINE

## 2025-04-15 RX ORDER — AMLODIPINE BESYLATE 5 MG/1
1 TABLET ORAL DAILY
COMMUNITY
Start: 2024-08-29

## 2025-04-15 RX ORDER — CYCLOBENZAPRINE HCL 10 MG
TABLET ORAL
COMMUNITY
Start: 2025-04-04

## 2025-04-15 ASSESSMENT — SLEEP AND FATIGUE QUESTIONNAIRES
HOW LIKELY ARE YOU TO NOD OFF OR FALL ASLEEP WHILE WATCHING TV: HIGH CHANCE OF DOZING
HOW LIKELY ARE YOU TO NOD OFF OR FALL ASLEEP WHILE SITTING AND READING: SLIGHT CHANCE OF DOZING
HOW LIKELY ARE YOU TO NOD OFF OR FALL ASLEEP WHILE SITTING INACTIVE IN A PUBLIC PLACE: WOULD NEVER DOZE
HOW LIKELY ARE YOU TO NOD OFF OR FALL ASLEEP IN A CAR, WHILE STOPPED FOR A FEW MINUTES IN TRAFFIC: WOULD NEVER DOZE
HOW LIKELY ARE YOU TO NOD OFF OR FALL ASLEEP WHILE SITTING QUIETLY AFTER LUNCH WITHOUT ALCOHOL: WOULD NEVER DOZE
ESS TOTAL SCORE: 7
HOW LIKELY ARE YOU TO NOD OFF OR FALL ASLEEP WHEN YOU ARE A PASSENGER IN A CAR FOR AN HOUR WITHOUT A BREAK: WOULD NEVER DOZE
HOW LIKELY ARE YOU TO NOD OFF OR FALL ASLEEP WHILE SITTING AND TALKING TO SOMEONE: WOULD NEVER DOZE
HOW LIKELY ARE YOU TO NOD OFF OR FALL ASLEEP WHILE LYING DOWN TO REST IN THE AFTERNOON WHEN CIRCUMSTANCES PERMIT: HIGH CHANCE OF DOZING

## 2025-04-15 NOTE — PROGRESS NOTES
[facility]                                                                  CHIEF COMPLAINT: RADHA        HPI:   HST and CPAP titration were reviewed by me and noted below.Results were dicussed with patient and multiple good questions were answered.   Started CPAP and doing much better. Patient is using CPAP 5-6   hrs/night. Using humidifier. No snoring on CPAP. The pressure is well tolerated. The mask is comfortable. No mask leak. No significant daytime sleepiness. No nodding off when driving. Bedtime is 11 pm and rise time is 6 am. Sleep onset is 10 minutes.  ESS is 7.         Past Medical History:   Diagnosis Date    Anemia     Brown's palsy     Goiter, simple     Morbid obesity        Past Surgical History:        Procedure Laterality Date    KNEE SURGERY      in 6th grade left knee    TONSILLECTOMY      when 8 yrs old       Allergies:  is allergic to lisinopril and codeine.  Social History:    TOBACCO:   reports that she has never smoked. She has been exposed to tobacco smoke. She has never used smokeless tobacco.  ETOH:   reports no history of alcohol use.      Family History:       Problem Relation Age of Onset    Heart Disease Mother     Stroke Mother     Cancer Mother     Heart Disease Maternal Grandmother     Heart Disease Maternal Grandfather     Heart Disease Maternal Aunt     Breast Cancer Maternal Aunt        Current Medications:    Current Outpatient Medications:     amLODIPine (NORVASC) 5 MG tablet, Take 1 tablet by mouth daily, Disp: , Rfl:     cyclobenzaprine (FLEXERIL) 10 MG tablet, , Disp: , Rfl:     Drospirenone (SLYND) 4 MG TABS, Take by mouth, Disp: , Rfl:     cloNIDine (CATAPRES) 0.1 MG tablet, Take 1 tablet by mouth 2 times daily, Disp: 60 tablet, Rfl: 0    carvedilol (COREG) 3.125 MG tablet, Take 1 tablet by mouth 2 times daily (with meals), Disp: 60 tablet, Rfl: 0    NIFEdipine (PROCARDIA XL) 30 MG extended release

## 2025-05-08 NOTE — PROGRESS NOTES
CARDIOLOGY OFFICE NOTE        Patient Name: Court Rascon  Primary Care physician: Christine Wesley APRN - CNP    Reason for Referral/Chief Complaint: Court Rascon is a 45 y.o. patient who presents today for a cardiology follow up.    History of Present Illness:   Court Rascon is a 45 y.o. 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  Patient recently seen cardiology at [facility] who recommended patient be evaluated in the hospital.    Patient was admitted on 8/20/2024 for concerns of acute decompensated heart failure.  Echo showed preserved EF, diastolic function and no significant valvular disease.  RV was normal size and with normal function.  She was started on losartan HCTZ as well as daily furosemide.  Patient states she was doing okay but then she had worsening fatigue and still had lower extremity edema.  Patient states prior history of angioedema to ACE inhibitor.    Admitted 9/4/24 for RUI. Discharged on coreg 3.125mg BID, procardia 30mg daily, and clonidine 01, changed lasix to as needed.  Patient received iron infusion prior to discharge from the hospital.   OV 10/31/24 she is doing well. She is scheduled for a sleep study 11/14.     Today she is doing well. She reports increased swelling, she states she has been so busy at work that she has not been able to get up and walk around. She does wear her compression stockings. Patient denies current chest pain, shortness of breath, palpitations, dizziness or syncope. She had her CPAP tritiated and is adherent with wearing and feels a lot better with. She is going to try to get back in to weightloss program at work.

## 2025-05-20 ENCOUNTER — OFFICE VISIT (OUTPATIENT)
Dept: CARDIOLOGY CLINIC | Age: 45
End: 2025-05-20
Payer: COMMERCIAL

## 2025-05-20 VITALS
BODY MASS INDEX: 45.99 KG/M2 | WEIGHT: 293 LBS | DIASTOLIC BLOOD PRESSURE: 88 MMHG | SYSTOLIC BLOOD PRESSURE: 138 MMHG | HEART RATE: 78 BPM | OXYGEN SATURATION: 98 % | HEIGHT: 67 IN

## 2025-05-20 DIAGNOSIS — Z79.899 MEDICATION MANAGEMENT: ICD-10-CM

## 2025-05-20 DIAGNOSIS — E07.9 THYROID DISEASE: ICD-10-CM

## 2025-05-20 DIAGNOSIS — G47.33 OSA (OBSTRUCTIVE SLEEP APNEA): ICD-10-CM

## 2025-05-20 DIAGNOSIS — E66.01 MORBID OBESITY (HCC): ICD-10-CM

## 2025-05-20 DIAGNOSIS — D50.8 OTHER IRON DEFICIENCY ANEMIA: Primary | ICD-10-CM

## 2025-05-20 DIAGNOSIS — R60.0 BILATERAL LOWER EXTREMITY EDEMA: ICD-10-CM

## 2025-05-20 DIAGNOSIS — E78.2 MIXED HYPERLIPIDEMIA: ICD-10-CM

## 2025-05-20 DIAGNOSIS — I89.0 LYMPHEDEMA: ICD-10-CM

## 2025-05-20 DIAGNOSIS — I10 ESSENTIAL HYPERTENSION: ICD-10-CM

## 2025-05-20 PROCEDURE — 1036F TOBACCO NON-USER: CPT | Performed by: STUDENT IN AN ORGANIZED HEALTH CARE EDUCATION/TRAINING PROGRAM

## 2025-05-20 PROCEDURE — 3075F SYST BP GE 130 - 139MM HG: CPT | Performed by: STUDENT IN AN ORGANIZED HEALTH CARE EDUCATION/TRAINING PROGRAM

## 2025-05-20 PROCEDURE — 99214 OFFICE O/P EST MOD 30 MIN: CPT | Performed by: STUDENT IN AN ORGANIZED HEALTH CARE EDUCATION/TRAINING PROGRAM

## 2025-05-20 PROCEDURE — 3079F DIAST BP 80-89 MM HG: CPT | Performed by: STUDENT IN AN ORGANIZED HEALTH CARE EDUCATION/TRAINING PROGRAM

## 2025-05-20 PROCEDURE — G2211 COMPLEX E/M VISIT ADD ON: HCPCS | Performed by: STUDENT IN AN ORGANIZED HEALTH CARE EDUCATION/TRAINING PROGRAM

## 2025-05-20 PROCEDURE — G8417 CALC BMI ABV UP PARAM F/U: HCPCS | Performed by: STUDENT IN AN ORGANIZED HEALTH CARE EDUCATION/TRAINING PROGRAM

## 2025-05-20 PROCEDURE — G8427 DOCREV CUR MEDS BY ELIG CLIN: HCPCS | Performed by: STUDENT IN AN ORGANIZED HEALTH CARE EDUCATION/TRAINING PROGRAM

## 2025-05-20 NOTE — PATIENT INSTRUCTIONS
Continue taking carvedilol 3.125mg twice a day, clonidine 0.1mg twice a day, nifedipine 30mg twice a day.   Please obtain the following labs; -LIPID FASTING, CBC, CMP, TSH, IRON and TIBC, FERRITIN.   Monitor your blood pressure at home twice a day, morning and night. Recommend a monitor for your bicep.  Goal 130/80 or less. Also call if BP is low (<100 systolic) or if you are having dizziness/fatigue.

## 2025-06-14 ENCOUNTER — HOSPITAL ENCOUNTER (OUTPATIENT)
Dept: LAB | Age: 45
Discharge: HOME | End: 2025-06-14
Payer: COMMERCIAL

## 2025-06-14 DIAGNOSIS — D50.8 OTHER IRON DEFICIENCY ANEMIA: ICD-10-CM

## 2025-06-14 DIAGNOSIS — Z79.899 MEDICATION MANAGEMENT: ICD-10-CM

## 2025-06-14 LAB
ALBUMIN SERPL-MCNC: 4.1 G/DL (ref 3.4–5)
ALBUMIN/GLOB SERPL: 1.4 {RATIO} (ref 1.1–2.2)
ALP SERPL-CCNC: 73 U/L (ref 40–129)
ALT SERPL-CCNC: 14 U/L (ref 10–40)
ANION GAP SERPL CALCULATED.3IONS-SCNC: 9 MMOL/L (ref 3–16)
AST SERPL-CCNC: 15 U/L (ref 15–37)
BASOPHILS # BLD: 0 K/UL (ref 0–0.2)
BASOPHILS NFR BLD: 0.5 %
BILIRUB SERPL-MCNC: 0.7 MG/DL (ref 0–1)
BUN SERPL-MCNC: 12 MG/DL (ref 7–20)
CALCIUM SERPL-MCNC: 8.7 MG/DL (ref 8.3–10.6)
CHLORIDE SERPL-SCNC: 105 MMOL/L (ref 99–110)
CHOLEST SERPL-MCNC: 164 MG/DL (ref 0–199)
CO2 SERPL-SCNC: 22 MMOL/L (ref 21–32)
CREAT SERPL-MCNC: 0.7 MG/DL (ref 0.6–1.1)
DEPRECATED RDW RBC AUTO: 14.2 % (ref 12.4–15.4)
EOSINOPHIL # BLD: 0.3 K/UL (ref 0–0.6)
EOSINOPHIL NFR BLD: 2.8 %
FERRITIN SERPL IA-MCNC: 84.8 NG/ML (ref 15–150)
GFR SERPLBLD CREATININE-BSD FMLA CKD-EPI: >90 ML/MIN/{1.73_M2}
GLUCOSE SERPL-MCNC: 107 MG/DL (ref 70–99)
HCT VFR BLD AUTO: 43 % (ref 36–48)
HDLC SERPL-MCNC: 47 MG/DL (ref 40–60)
HGB BLD-MCNC: 14.4 G/DL (ref 12–16)
IRON SATN MFR SERPL: 31 % (ref 15–50)
IRON SERPL-MCNC: 93 UG/DL (ref 37–145)
LDLC SERPL CALC-MCNC: 104 MG/DL
LYMPHOCYTES # BLD: 1.6 K/UL (ref 1–5.1)
LYMPHOCYTES NFR BLD: 16.7 %
MCH RBC QN AUTO: 27.6 PG (ref 26–34)
MCHC RBC AUTO-ENTMCNC: 33.4 G/DL (ref 31–36)
MCV RBC AUTO: 82.7 FL (ref 80–100)
MONOCYTES # BLD: 0.6 K/UL (ref 0–1.3)
MONOCYTES NFR BLD: 6.2 %
NEUTROPHILS # BLD: 7 K/UL (ref 1.7–7.7)
NEUTROPHILS NFR BLD: 73.8 %
PLATELET # BLD AUTO: 356 K/UL (ref 135–450)
PMV BLD AUTO: 8.3 FL (ref 5–10.5)
POTASSIUM SERPL-SCNC: 4.1 MMOL/L (ref 3.5–5.1)
PROT SERPL-MCNC: 7.1 G/DL (ref 6.4–8.2)
RBC # BLD AUTO: 5.2 M/UL (ref 4–5.2)
SODIUM SERPL-SCNC: 136 MMOL/L (ref 136–145)
TIBC SERPL-MCNC: 304 UG/DL (ref 260–445)
TRIGL SERPL-MCNC: 64 MG/DL (ref 0–150)
TSH SERPL DL<=0.005 MIU/L-ACNC: 0.67 UIU/ML (ref 0.27–4.2)
VLDLC SERPL CALC-MCNC: 13 MG/DL
WBC # BLD AUTO: 9.5 K/UL (ref 4–11)

## 2025-06-14 PROCEDURE — 83540 ASSAY OF IRON: CPT

## 2025-06-14 PROCEDURE — 36415 COLL VENOUS BLD VENIPUNCTURE: CPT

## 2025-06-14 PROCEDURE — 84443 ASSAY THYROID STIM HORMONE: CPT

## 2025-06-14 PROCEDURE — 82728 ASSAY OF FERRITIN: CPT

## 2025-06-14 PROCEDURE — 80053 COMPREHEN METABOLIC PANEL: CPT

## 2025-06-14 PROCEDURE — 83550 IRON BINDING TEST: CPT

## 2025-06-14 PROCEDURE — 85025 COMPLETE CBC W/AUTO DIFF WBC: CPT

## 2025-06-14 PROCEDURE — 80061 LIPID PANEL: CPT

## 2025-06-17 ENCOUNTER — RESULTS FOLLOW-UP (OUTPATIENT)
Dept: CARDIOLOGY CLINIC | Age: 45
End: 2025-06-17